# Patient Record
Sex: MALE | Race: WHITE | NOT HISPANIC OR LATINO | Employment: OTHER | URBAN - METROPOLITAN AREA
[De-identification: names, ages, dates, MRNs, and addresses within clinical notes are randomized per-mention and may not be internally consistent; named-entity substitution may affect disease eponyms.]

---

## 2020-03-03 ENCOUNTER — CLINICAL SUPPORT (OUTPATIENT)
Dept: FAMILY MEDICINE | Facility: CLINIC | Age: 70
End: 2020-03-03

## 2020-03-03 VITALS
HEIGHT: 72 IN | SYSTOLIC BLOOD PRESSURE: 152 MMHG | DIASTOLIC BLOOD PRESSURE: 78 MMHG | HEART RATE: 71 BPM | TEMPERATURE: 98 F | OXYGEN SATURATION: 98 % | BODY MASS INDEX: 37.65 KG/M2 | WEIGHT: 278 LBS | RESPIRATION RATE: 20 BRPM

## 2020-03-03 DIAGNOSIS — Z02.4 ENCOUNTER FOR DEPARTMENT OF TRANSPORTATION (DOT) EXAMINATION FOR DRIVING LICENSE RENEWAL: Primary | ICD-10-CM

## 2020-03-03 PROCEDURE — 99499 PHYSICAL - MODERATE COMPLEXITY: ICD-10-PCS | Mod: S$GLB,,, | Performed by: FAMILY MEDICINE

## 2020-03-03 PROCEDURE — 99499 UNLISTED E&M SERVICE: CPT | Mod: S$GLB,,, | Performed by: FAMILY MEDICINE

## 2020-03-03 RX ORDER — ENALAPRIL MALEATE 20 MG/1
20 TABLET ORAL DAILY
COMMUNITY
End: 2022-11-03

## 2020-03-03 RX ORDER — AMLODIPINE BESYLATE 10 MG/1
10 TABLET ORAL DAILY
COMMUNITY
End: 2022-11-03

## 2020-03-03 RX ORDER — METFORMIN HYDROCHLORIDE 500 MG/1
500 TABLET ORAL
COMMUNITY
End: 2023-06-14

## 2020-03-03 RX ORDER — METOPROLOL SUCCINATE 50 MG/1
50 TABLET, EXTENDED RELEASE ORAL DAILY
COMMUNITY

## 2020-03-03 RX ORDER — OMEPRAZOLE 40 MG/1
40 CAPSULE, DELAYED RELEASE ORAL DAILY
COMMUNITY

## 2020-03-03 RX ORDER — SIMVASTATIN 40 MG/1
40 TABLET, FILM COATED ORAL NIGHTLY
COMMUNITY

## 2020-03-03 RX ORDER — ASPIRIN 81 MG/1
81 TABLET ORAL DAILY
COMMUNITY
End: 2022-11-03

## 2020-03-03 RX ORDER — CLOPIDOGREL BISULFATE 75 MG/1
75 TABLET ORAL DAILY
COMMUNITY
End: 2022-11-03

## 2022-06-14 ENCOUNTER — CLINICAL SUPPORT (OUTPATIENT)
Dept: PULMONOLOGY | Facility: CLINIC | Age: 72
End: 2022-06-14
Payer: OTHER GOVERNMENT

## 2022-06-14 ENCOUNTER — OFFICE VISIT (OUTPATIENT)
Dept: PULMONOLOGY | Facility: CLINIC | Age: 72
End: 2022-06-14
Payer: OTHER GOVERNMENT

## 2022-06-14 VITALS
RESPIRATION RATE: 18 BRPM | SYSTOLIC BLOOD PRESSURE: 162 MMHG | WEIGHT: 286.63 LBS | DIASTOLIC BLOOD PRESSURE: 82 MMHG | HEIGHT: 72 IN | HEART RATE: 69 BPM | OXYGEN SATURATION: 97 % | BODY MASS INDEX: 38.82 KG/M2

## 2022-06-14 DIAGNOSIS — R94.2 DIFFUSION CAPACITY OF LUNG (DL), DECREASED: ICD-10-CM

## 2022-06-14 DIAGNOSIS — R91.1 LUNG NODULE: Primary | ICD-10-CM

## 2022-06-14 DIAGNOSIS — Z72.0 TOBACCO ABUSE: ICD-10-CM

## 2022-06-14 DIAGNOSIS — E66.9 OBESITY (BMI 35.0-39.9 WITHOUT COMORBIDITY): ICD-10-CM

## 2022-06-14 DIAGNOSIS — R91.1 LUNG NODULE: ICD-10-CM

## 2022-06-14 DIAGNOSIS — Z71.6 TOBACCO ABUSE COUNSELING: ICD-10-CM

## 2022-06-14 PROCEDURE — 94726 PULM FUNCT TST PLETHYSMOGRAP: ICD-10-PCS | Mod: S$GLB,,, | Performed by: INTERNAL MEDICINE

## 2022-06-14 PROCEDURE — 99204 PR OFFICE/OUTPT VISIT, NEW, LEVL IV, 45-59 MIN: ICD-10-PCS | Mod: 25,S$GLB,, | Performed by: INTERNAL MEDICINE

## 2022-06-14 PROCEDURE — 94726 PLETHYSMOGRAPHY LUNG VOLUMES: CPT | Mod: S$GLB,,, | Performed by: INTERNAL MEDICINE

## 2022-06-14 PROCEDURE — 99204 OFFICE O/P NEW MOD 45 MIN: CPT | Mod: 25,S$GLB,, | Performed by: INTERNAL MEDICINE

## 2022-06-14 PROCEDURE — 94060 EVALUATION OF WHEEZING: CPT | Mod: S$GLB,,, | Performed by: INTERNAL MEDICINE

## 2022-06-14 PROCEDURE — 94729 PR C02/MEMBANE DIFFUSE CAPACITY: ICD-10-PCS | Mod: S$GLB,,, | Performed by: INTERNAL MEDICINE

## 2022-06-14 PROCEDURE — 94060 PR EVAL OF BRONCHOSPASM: ICD-10-PCS | Mod: S$GLB,,, | Performed by: INTERNAL MEDICINE

## 2022-06-14 PROCEDURE — 94729 DIFFUSING CAPACITY: CPT | Mod: S$GLB,,, | Performed by: INTERNAL MEDICINE

## 2022-06-14 NOTE — PROGRESS NOTES
Subjective:    Patient Identification:   Patient ID: Marcia Gregory is a 71 y.o. male.    Ref[facility] Provider:   [facility]    Chief Complaint:   Right upper lung field FDG avid 12 mm pulmonary nodule in a smoker    History of Present Illness:    Marcia Gregory is a 71 y.o. male who presents  For the management of above-mentioned problem.    Patient had a CT scan of the chest in April which reportedly showed a right upper lung field nodule.  This was followed by a PET-CT scan ordered by his primary care provider in May of this year which showed 12.6 SUV avid right upper lung field 12 mm nodule suggestive of malignancy.  Patient has a prolonged history of tobacco use which is about 40 pack years and is also smoking at this point.  He denies any shortness of breath, cough, wheezing.  He is scheduled to go to [facility] to get a nasal mass removal which was thought not to be a malignancy but at high risk of malignancy in future.  Patient had seen nurse practitioner of Dr. Pham in the past and is currently on aspirin and Plavix for cardiac stents, last stent was put at least more than 10 years ago.  He has significant history of exposure to better chemicals, asbestos and foster gene gas.  He is currently working as a .  PFTs show a normal spirometry and lung volumes with a mildly reduced diffusion capacity which is 70% predicted.  He denies any loss of appetite, weight loss, hemoptysis, night sweats, fever or chills etc..    Review of Systems:  Review of Systems   Constitutional: Negative for fever, chills, weight loss, weight gain, activity change, appetite change, fatigue, night sweats and weakness.   HENT: Negative for nosebleeds, postnasal drip, rhinorrhea, sinus pressure, sore throat, trouble swallowing, voice change, congestion, ear pain and hearing loss.    Eyes: Negative for redness and itching.   Respiratory: Negative for cough, hemoptysis, sputum production, choking, chest tightness, shortness  of breath, wheezing, orthopnea, previous hospitialization due to pulmonary problems, asthma nighttime symptoms, pleurisy, dyspnea on extertion, use of rescue inhaler and Paroxysmal Nocturnal Dyspnea.    Cardiovascular: Negative for chest pain, palpitations and leg swelling.   Genitourinary: Negative for difficulty urinating and hematuria.   Endocrine: Negative for polydipsia, polyphagia, cold intolerance, heat intolerance and polyuria.    Musculoskeletal: Negative for arthralgias, back pain, gait problem, joint swelling and myalgias.   Skin: Negative for rash.   Gastrointestinal: Negative for nausea, vomiting, abdominal pain, abdominal distention and acid reflux.   Neurological: Negative for dizziness, syncope, weakness, light-headedness and headaches.   Hematological: Negative for adenopathy. Does not bruise/bleed easily and no excessive bruising.   Psychiatric/Behavioral: Negative for confusion and sleep disturbance. The patient is not nervous/anxious.          Allergies: Review of patient's allergies indicates:  No Known Allergies    Medications:      Past Medical History:      Past Medical History:   Diagnosis Date    Blood clot in vein     Diabetes mellitus, type 2     Hyperlipidemia     Hypertension     Myocardial infarction        Family History:      Family History   Problem Relation Age of Onset    Heart failure Father     Cancer Sister     Diabetes Brother         Social History:      Past Surgical History:   Procedure Laterality Date    cardiac stents         Physical Exam:  Vitals:    06/14/22 1102   BP: (!) 162/82   Pulse: 69   Resp: 18     Physical Exam   Constitutional: He is oriented to person, place, and time. He appears not cachectic. No distress. He is obese.   HENT:   Head: Normocephalic.   Right Ear: External ear normal.   Left Ear: External ear normal.   Nose: Nose normal. No mucosal edema. No polyps.   Mouth/Throat: Oropharynx is clear and moist. Normal dentition. No oropharyngeal  exudate. Mallampati Score: III.   Neck: No JVD present. No tracheal deviation present. No thyromegaly present.   Cardiovascular: Normal rate, regular rhythm, normal heart sounds and intact distal pulses. Exam reveals no gallop and no friction rub.   No murmur heard.  Pulmonary/Chest: Normal expansion, symmetric chest wall expansion, effort normal and breath sounds normal. No stridor. No respiratory distress. He has no decreased breath sounds. He has no wheezes. He has no rhonchi. He has no rales. Chest wall is not dull to percussion. He exhibits no tenderness. Negative for egophony. Negative for tactile fremitus.   Abdominal: Soft. Bowel sounds are normal. He exhibits no distension and no mass. There is no hepatosplenomegaly. There is no abdominal tenderness. There is no rebound and no guarding. No hernia.   Musculoskeletal:         General: No tenderness, deformity or edema. Normal range of motion.      Cervical back: Normal range of motion and neck supple.   Lymphadenopathy: No supraclavicular adenopathy is present.     He has no cervical adenopathy.     He has no axillary adenopathy.   Neurological: He is alert and oriented to person, place, and time. He has normal reflexes. He displays normal reflexes. No cranial nerve deficit. He exhibits normal muscle tone.   Skin: Skin is warm. No rash noted. No cyanosis or erythema. No pallor. Nails show no clubbing.   Psychiatric: He has a normal mood and affect. His behavior is normal. Judgment and thought content normal.            Accessory Clinical Data:      CT scan:   CT chest and PET-CT reports were reviewed from outside facility in findings dictated under HPI.    PFTs:   Mildly reduced at DLCO of 70% predicted likely due to active smoking, otherwise normal profile.    6MWT:  None available    TTE: none available    Lab data:    All radiographic imaging of the chest, PFT tracings/data, and 6MWT data have been independently reviewed and interpreted unless otherwise  specified.     Assessment and Plan:        Problem List Items Addressed This Visit    None     Visit Diagnoses     Lung nodule    -  Primary    Relevant Orders    CT Guided Needle Placement    Tobacco abuse        Relevant Orders    Ambulatory referral/consult to Smoking Cessation Program    Tobacco abuse counseling        Relevant Orders    Ambulatory referral/consult to Smoking Cessation Program    Diffusion capacity of lung (dl), decreased        Obesity (BMI 35.0-39.9 without comorbidity)             Orders Placed This Encounter   Procedures    CT Guided Needle Placement     Standing Status:   Future     Standing Expiration Date:   6/14/2023     Order Specific Question:   Reason for Exam:     Answer:   FDG avid RUL12 mm nodule in a 40+ pack year smoker     Order Specific Question:   May the Radiologist modify the order per protocol to meet the clinical needs of the patient?     Answer:   Yes    Ambulatory referral/consult to Smoking Cessation Program     Standing Status:   Future     Standing Expiration Date:   7/14/2023     Referral Priority:   Routine     Referral Type:   Consultation     Referral Reason:   Specialty Services Required     Requested Specialty:   CTTS     Number of Visits Requested:   1      Patient needs CT-guided biopsy as this nodule is highly suspicious for malignancy.    We will get Cardiology clearance to hold Plavix x 5 days for CT-guided right upper lung field nodule biopsy.  Patient tells me that this has been held several times for different surgeries without any problems.    Excellent PFTs if he needs resection.    PFTs do not meet criteria of chronic obstructive pulmonary disease by ats criteria.  Patient does not take any inhalers and is very active without any symptoms.    Tobacco cessation counseling was given to the patient and he will be referred to smoking cessation program as he was interested in quitting.    More than 50% of 45 min time was spent face-to-face on counseling,  in reviewing the imaging studies, reviewing notes from primary care provider, performing appropriate examination, counseling and educating the patient regarding the findings on CT/PET scan, ordering appropriate follow-up studies, documenting clinical information in the electronic medical record and care coordination.    Follow up for   After biopsy to discuss results.      Thank you very much for involving me in the care of this patient.  Please do not hesitate to reach me if you have any further questions or concerns.    This note is dictated on M*Modal word recognition program.  There are word recognition mistakes that are occasionally missed on review.

## 2022-07-18 LAB
ABS NRBC COUNT: 0 THOU/UL (ref 0–0.01)
ABSOLUTE BASOPHIL: 0.1 10*3/UL (ref 0–0.3)
ABSOLUTE EOSINOPHIL: 0.1 10*3/UL (ref 0–0.6)
ABSOLUTE IMMATURE GRAN: 0.03 THOU/UL (ref 0–0.03)
ABSOLUTE LYMPHOCYTE: 2.5 10*3/UL (ref 1.2–4)
ABSOLUTE MONOCYTE: 0.4 10*3/UL (ref 0.1–0.8)
APTT PPP: 35.6 SEC (ref 25.7–36.7)
BASOPHILS NFR BLD: 0.9 % (ref 0–3)
EOSINOPHIL NFR BLD: 1.7 % (ref 0–6)
ERYTHROCYTE [DISTWIDTH] IN BLOOD BY AUTOMATED COUNT: 13.7 % (ref 0–15.5)
HCT VFR BLD AUTO: 44.1 % (ref 42–52)
HGB BLD-MCNC: 14.5 G/DL (ref 14–18)
IMMATURE GRANULOCYTES: 0.4 % (ref 0–0.43)
IMMATURE PLATELET FRACTION: 2.2 % (ref 0–8.6)
INR PPP: 1 INR (ref 0.9–1.1)
LYMPHOCYTES NFR BLD: 31.1 % (ref 20–45)
MCH RBC QN AUTO: 27.1 PG (ref 27–32)
MCHC RBC AUTO-ENTMCNC: 32.9 % (ref 32–36)
MCV RBC AUTO: 82.3 FL (ref 80–97)
MONOCYTES NFR BLD: 5.1 % (ref 2–10)
NEUTROPHILS # BLD AUTO: 4.9 10*3/UL (ref 1.4–7)
NEUTROPHILS NFR BLD: 60.8 % (ref 50–80)
NUCLEATED RED BLOOD CELLS: 0 % (ref 0–0.2)
PLATELETS: 171 10*3/UL (ref 130–400)
PMV BLD AUTO: 9.6 FL (ref 9.2–12.2)
PROTHROMBIN TIME: 11.8 SEC (ref 10.2–12.9)
RBC # BLD AUTO: 5.36 10*6/UL (ref 4.7–6.1)
WBC # BLD: 8 10*3/UL (ref 4.5–10)

## 2022-07-20 LAB — SPECIMEN TO PATHOLOGY: NORMAL

## 2022-07-25 ENCOUNTER — OFFICE VISIT (OUTPATIENT)
Dept: HEMATOLOGY/ONCOLOGY | Facility: CLINIC | Age: 72
End: 2022-07-25
Payer: OTHER GOVERNMENT

## 2022-07-25 VITALS
SYSTOLIC BLOOD PRESSURE: 157 MMHG | RESPIRATION RATE: 18 BRPM | HEART RATE: 67 BPM | WEIGHT: 279 LBS | DIASTOLIC BLOOD PRESSURE: 80 MMHG | BODY MASS INDEX: 37.79 KG/M2 | OXYGEN SATURATION: 97 % | HEIGHT: 72 IN

## 2022-07-25 DIAGNOSIS — C34.91 NSCLC OF RIGHT LUNG: ICD-10-CM

## 2022-07-25 DIAGNOSIS — C34.11 MALIGNANT NEOPLASM OF UPPER LOBE OF RIGHT LUNG: ICD-10-CM

## 2022-07-25 PROCEDURE — 99205 PR OFFICE/OUTPT VISIT, NEW, LEVL V, 60-74 MIN: ICD-10-PCS | Mod: S$GLB,,, | Performed by: INTERNAL MEDICINE

## 2022-07-25 PROCEDURE — 99205 OFFICE O/P NEW HI 60 MIN: CPT | Mod: S$GLB,,, | Performed by: INTERNAL MEDICINE

## 2022-07-25 NOTE — PROGRESS NOTES
MEDICAL ONCOLOGY INITIAL CONSULTATION NOTE    Patient ID: Marcia Gregory is a 71 y.o. male.    Chief Complaint: Lung Cancer    HPI:  Patient is a 71-year-old male active current smoker with past medical history of DVT, diabetes mellitus, hyperlipidemia, hypertension, CAD status post myocardial infarction with past surgical history of knee replacement and tonsillectomy was referred by Pulmonary after recent right upper lobe lung nodule core biopsy was consistent with moderately differentiated squamous cell carcinoma please see detailed pathology report below.  Patient reports having a PET scan done on 2022 which showed findings as below.  Patient presents to our clinic today 22 for further evaluation.      Imagin22    Chest:  There is a 12 mm nodule in the right upper lung field showing an increased radiotracer uptake with an SUV of 12.6    Abdomen:  Normal  Pelvis:  Normal  Osseous structures:  Normal    Impression:  Hypermetabolic nodule in the right upper lung field consistent with malignancy    Pathology: 22      RIGHT UPPER LOBE OF LUNG, NODULE, CORE BIOPSY:      - MODERATELY DIFFERENTIATED SQUAMOUS CELL CARCINOMA (NON-SMALL   CARCINOMA).   Comment:  Immunohistochemical staining is performed upon sections from block   1A.  The tumor cells demonstrate positivity for CK5/6, p40, and p63.   Staining is negative for TTF-1 and CK7.  All associated controls stain as   expected.         Past Medical History:   Diagnosis Date    Blood clot in vein     Diabetes mellitus, type 2     Hyperlipidemia     Hypertension     Myocardial infarction     Skin cancer      Family History   Problem Relation Age of Onset    Heart failure Father     Cancer Sister     Breast cancer Sister     Diabetes Brother      Social History     Socioeconomic History    Marital status:    Tobacco Use    Smoking status: Current Every Day Smoker     Packs/day: 1.00     Years: 40.00     Pack years: 40.00      Types: Cigarettes    Smokeless tobacco: Never Used   Substance and Sexual Activity    Alcohol use: Not Currently     Comment: maybe once a year    Drug use: Never     Past Surgical History:   Procedure Laterality Date    cardiac stents      KNEE SURGERY      TONSILLECTOMY           Review of systems:  Review of Systems   Constitutional: Negative for activity change, appetite change, chills, diaphoresis, fatigue and unexpected weight change.   HENT: Negative for congestion, facial swelling, hearing loss, mouth sores, trouble swallowing and voice change.    Eyes: Negative for photophobia, pain, discharge and itching.   Respiratory: Negative for apnea, cough, choking, chest tightness and shortness of breath.    Cardiovascular: Negative for chest pain, palpitations and leg swelling.   Gastrointestinal: Negative for abdominal distention, abdominal pain, anal bleeding and blood in stool.   Endocrine: Negative for cold intolerance, heat intolerance, polydipsia and polyphagia.   Genitourinary: Negative for difficulty urinating, dysuria, flank pain and hematuria.   Musculoskeletal: Negative for arthralgias, back pain, joint swelling, myalgias, neck pain and neck stiffness.   Skin: Negative for color change, pallor and wound.   Allergic/Immunologic: Negative for environmental allergies, food allergies and immunocompromised state.   Neurological: Negative for dizziness, seizures, facial asymmetry, speech difficulty, light-headedness, numbness and headaches.   Hematological: Negative for adenopathy. Does not bruise/bleed easily.   Psychiatric/Behavioral: Negative for agitation, behavioral problems, confusion, decreased concentration and sleep disturbance.            Physical Exam  Vitals and nursing note reviewed.   Constitutional:       General: He is not in acute distress.     Appearance: Normal appearance. He is not ill-appearing.   HENT:      Head: Normocephalic and atraumatic.      Nose: No congestion or  rhinorrhea.   Eyes:      General: No scleral icterus.     Extraocular Movements: Extraocular movements intact.      Pupils: Pupils are equal, round, and reactive to light.   Cardiovascular:      Rate and Rhythm: Normal rate and regular rhythm.      Pulses: Normal pulses.      Heart sounds: Normal heart sounds. No murmur heard.    No gallop.   Pulmonary:      Effort: Pulmonary effort is normal. No respiratory distress.      Breath sounds: Normal breath sounds. No stridor. No wheezing or rhonchi.   Abdominal:      General: Bowel sounds are normal. There is no distension.      Palpations: There is no mass.      Tenderness: There is no abdominal tenderness. There is no guarding.   Musculoskeletal:         General: No swelling, tenderness, deformity or signs of injury. Normal range of motion.      Cervical back: Normal range of motion and neck supple. No rigidity. No muscular tenderness.      Right lower leg: No edema.      Left lower leg: No edema.   Skin:     General: Skin is warm.      Coloration: Skin is not jaundiced or pale.      Findings: No bruising or lesion.   Neurological:      General: No focal deficit present.      Mental Status: He is alert and oriented to person, place, and time.      Cranial Nerves: No cranial nerve deficit.      Sensory: No sensory deficit.      Motor: No weakness.      Gait: Gait normal.   Psychiatric:         Mood and Affect: Mood normal.         Behavior: Behavior normal.         Thought Content: Thought content normal.              Vitals:    07/25/22 0828   BP: (!) 157/80   Pulse: 67   Resp: 18   Body surface area is 2.54 meters squared.    Assessment/Plan:      ECOG 1    1. 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  == Core biopsy done 07/20/2022 completed few days back showed findings as above.  I discussed with  him further management options for early stage lung cancer.  PFTs showed mildly reduced at DLCO of 70% predicted likely due to active smoking otherwise- normal profile.  I will make referral to cardiothoracic surgery for further evaluation.  Ideally I would like a PET scan to be repeated since it has been more than 2 months since his prior PET scan, but even if this is not done I would like this patient to be evaluated by Cardiothoracic surgery for consideration of pathologic mediastinal lymph node evaluation and if an operable candidate to undergo surgical exploration and resection.  Based on surgical resection we will determine if there is a need for any adjuvant treatment.  For clinical stage I A 2 with negative margins only observation is indicated.       Plan:    Referral to CT surgery- [facility]  PET scan for restaging      RTC in 3 weeks for MD visit with labs: CBC, CMP prior      Total time spent in counseling and discussion about further management options including relevant lab work, treatment,  prognosis, medications and intended side effects was more than 60 minutes. More than 50% of the time was spent on counseling and coordination of care.  I spent a total of 60 minutes on the day of the visit.This includes face to face time and non-face to face time preparing to see the patient (eg, review of tests), Obtaining and/or reviewing separately obtained history, Documenting clinical information in the electronic or other health record, Independently interpreting resultsand communicating results to the patient/family/caregiver, or Care coordination.

## 2022-08-01 ENCOUNTER — TELEPHONE (OUTPATIENT)
Dept: CARDIOTHORACIC SURGERY | Facility: CLINIC | Age: 72
End: 2022-08-01
Payer: MEDICARE

## 2022-08-01 NOTE — TELEPHONE ENCOUNTER
Received referral , called patient to schedule an appt. [facility] 11:10 am. 8/1/22.

## 2022-08-02 ENCOUNTER — CLINICAL SUPPORT (OUTPATIENT)
Dept: SMOKING CESSATION | Facility: CLINIC | Age: 72
End: 2022-08-02
Payer: COMMERCIAL

## 2022-08-02 DIAGNOSIS — F17.200 NICOTINE DEPENDENCE: Primary | ICD-10-CM

## 2022-08-02 PROCEDURE — 99404 PREV MED CNSL INDIV APPRX 60: CPT | Mod: S$GLB,,, | Performed by: GENERAL PRACTICE

## 2022-08-02 PROCEDURE — 99404 PR PREVENT COUNSEL,INDIV,60 MIN: ICD-10-PCS | Mod: S$GLB,,, | Performed by: GENERAL PRACTICE

## 2022-08-02 RX ORDER — IBUPROFEN 200 MG
1 TABLET ORAL DAILY
Qty: 28 PATCH | Refills: 0 | Status: SHIPPED | OUTPATIENT
Start: 2022-08-02 | End: 2023-06-14

## 2022-08-02 RX ORDER — DIPHENHYDRAMINE HCL 25 MG
4 CAPSULE ORAL
Qty: 200 EACH | Refills: 0 | Status: SHIPPED | OUTPATIENT
Start: 2022-08-02 | End: 2023-06-14

## 2022-08-17 ENCOUNTER — OFFICE VISIT (OUTPATIENT)
Dept: HEMATOLOGY/ONCOLOGY | Facility: CLINIC | Age: 72
End: 2022-08-17
Payer: OTHER GOVERNMENT

## 2022-08-17 VITALS
HEART RATE: 77 BPM | SYSTOLIC BLOOD PRESSURE: 146 MMHG | HEIGHT: 72 IN | OXYGEN SATURATION: 97 % | WEIGHT: 278 LBS | DIASTOLIC BLOOD PRESSURE: 82 MMHG | BODY MASS INDEX: 37.65 KG/M2 | RESPIRATION RATE: 17 BRPM

## 2022-08-17 DIAGNOSIS — C34.91 NSCLC OF RIGHT LUNG: Primary | ICD-10-CM

## 2022-08-17 DIAGNOSIS — C80.1 VERRUCOUS CARCINOMA: ICD-10-CM

## 2022-08-17 PROCEDURE — 99215 PR OFFICE/OUTPT VISIT, EST, LEVL V, 40-54 MIN: ICD-10-PCS | Mod: S$GLB,,, | Performed by: NURSE PRACTITIONER

## 2022-08-17 PROCEDURE — 99215 OFFICE O/P EST HI 40 MIN: CPT | Mod: S$GLB,,, | Performed by: NURSE PRACTITIONER

## 2022-08-17 NOTE — PROGRESS NOTES
MEDICAL ONCOLOGY INITIAL CONSULTATION NOTE    Patient ID: Marcia Gregory is a 71 y.o. male.    Chief Complaint: Lung Cancer    HPI:  Patient is a 71-year-old male active current smoker with past medical history of DVT, diabetes mellitus, hyperlipidemia, hypertension, CAD status post myocardial infarction with past surgical history of knee replacement and tonsillectomy was referred by Pulmonary after recent right upper lobe lung nodule core biopsy was consistent with moderately differentiated squamous cell carcinoma please see detailed pathology report below.  Patient reports having a PET scan done on 2022 which showed findings as below.  Patient presents to our clinic today 22 for further evaluation.      Imagin22    Chest:  There is a 12 mm nodule in the right upper lung field showing an increased radiotracer uptake with an SUV of 12.6    Abdomen:  Normal  Pelvis:  Normal  Osseous structures:  Normal    Impression:  Hypermetabolic nodule in the right upper lung field consistent with malignancy    Pathology: 22      RIGHT UPPER LOBE OF LUNG, NODULE, CORE BIOPSY:      - MODERATELY DIFFERENTIATED SQUAMOUS CELL CARCINOMA (NON-SMALL   CARCINOMA).   Comment:  Immunohistochemical staining is performed upon sections from block   1A.  The tumor cells demonstrate positivity for CK5/6, p40, and p63.   Staining is negative for TTF-1 and CK7.  All associated controls stain as   expected.         Past Medical History:   Diagnosis Date    Blood clot in vein     Diabetes mellitus, type 2     Hyperlipidemia     Hypertension     Myocardial infarction     NSCLC of right lung 2022    Skin cancer      Family History   Problem Relation Age of Onset    Heart failure Father     Cancer Sister     Breast cancer Sister     Diabetes Brother      Social History     Socioeconomic History    Marital status:    Tobacco Use    Smoking status: Current Every Day Smoker      Packs/day: 1.00     Years: 40.00     Pack years: 40.00     Types: Cigarettes    Smokeless tobacco: Never Used   Substance and Sexual Activity    Alcohol use: Not Currently     Comment: maybe once a year    Drug use: Never     Past Surgical History:   Procedure Laterality Date    cardiac stents      KNEE SURGERY      TONSILLECTOMY           Review of systems:  Review of Systems   Constitutional: Negative for activity change, appetite change, chills, diaphoresis, fatigue and unexpected weight change.   HENT: Negative for congestion, facial swelling, hearing loss, mouth sores, trouble swallowing and voice change.    Eyes: Negative for photophobia, pain, discharge and itching.   Respiratory: Negative for apnea, cough, choking, chest tightness and shortness of breath.    Cardiovascular: Negative for chest pain, palpitations and leg swelling.   Gastrointestinal: Negative for abdominal distention, abdominal pain, anal bleeding and blood in stool.   Endocrine: Negative for cold intolerance, heat intolerance, polydipsia and polyphagia.   Genitourinary: Negative for difficulty urinating, dysuria, flank pain and hematuria.   Musculoskeletal: Negative for arthralgias, back pain, joint swelling, myalgias, neck pain and neck stiffness.   Skin: Negative for color change, pallor and wound.   Allergic/Immunologic: Negative for environmental allergies, food allergies and immunocompromised state.   Neurological: Negative for dizziness, seizures, facial asymmetry, speech difficulty, light-headedness, numbness and headaches.   Hematological: Negative for adenopathy. Does not bruise/bleed easily.   Psychiatric/Behavioral: Negative for agitation, behavioral problems, confusion, decreased concentration and sleep disturbance.            Physical Exam  Vitals and nursing note reviewed.   Constitutional:       General: He is not in acute distress.     Appearance: Normal appearance. He is not ill-appearing.   HENT:      Head:  Normocephalic and atraumatic.      Nose: No congestion or rhinorrhea.   Eyes:      General: No scleral icterus.     Extraocular Movements: Extraocular movements intact.      Pupils: Pupils are equal, round, and reactive to light.   Cardiovascular:      Rate and Rhythm: Normal rate and regular rhythm.      Pulses: Normal pulses.      Heart sounds: Normal heart sounds. No murmur heard.    No gallop.   Pulmonary:      Effort: Pulmonary effort is normal. No respiratory distress.      Breath sounds: Normal breath sounds. No stridor. No wheezing or rhonchi.   Abdominal:      General: Bowel sounds are normal. There is no distension.      Palpations: There is no mass.      Tenderness: There is no abdominal tenderness. There is no guarding.   Musculoskeletal:         General: No swelling, tenderness, deformity or signs of injury. Normal range of motion.      Cervical back: Normal range of motion and neck supple. No rigidity. No muscular tenderness.      Right lower leg: No edema.      Left lower leg: No edema.   Skin:     General: Skin is warm.      Coloration: Skin is not jaundiced or pale.      Findings: No bruising or lesion.   Neurological:      General: No focal deficit present.      Mental Status: He is alert and oriented to person, place, and time.      Cranial Nerves: No cranial nerve deficit.      Sensory: No sensory deficit.      Motor: No weakness.      Gait: Gait normal.   Psychiatric:         Mood and Affect: Mood normal.         Behavior: Behavior normal.         Thought Content: Thought content normal.              Vitals:    08/17/22 0925   BP: (!) 146/82   Pulse: 77   Resp: 17   Body surface area is 2.53 meters squared.    Assessment/Plan:      ECOG 1    1. 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  == Core biopsy done 07/20/2022 completed  few days back showed findings as above.  I discussed with him further management options for early stage lung cancer.  PFTs showed mildly reduced at DLCO of 70% predicted likely due to active smoking otherwise- normal profile.  I will make referral to cardiothoracic surgery for further evaluation.  Ideally I would like a PET scan to be repeated since it has been more than 2 months since his prior PET scan, but even if this is not done I would like this patient to be evaluated by Cardiothoracic surgery for consideration of pathologic mediastinal lymph node evaluation and if an operable candidate to undergo surgical exploration and resection.  Based on surgical resection we will determine if there is a need for any adjuvant treatment.  For clinical stage I A 2 with negative margins only observation is indicated.   8/19/22: review of PET/CT shows very minimal disease growth. Lung lesion now measuring 14 mm (12 mm prior). He is to see Dr Isaac for surgical evaluation next week.     2. Right intranasal lesion: WELL DIFFERENTIATED VERRUCOID SCC CAN'T CONFIRM INVASION  - evaluated at [facility] for nasal polyp at the end of July, s/p recision on 8/2/22.  - 3 month f/u at [facility] planned      RTC in 2 weeks for MD visit to review POC after surgical evaluation       Total time spent in counseling and discussion about further management options including relevant lab work, treatment,  prognosis, medications and intended side effects was more than 60 minutes. More than 50% of the time was spent on counseling and coordination of care.  I spent a total of 60 minutes on the day of the visit.This includes face to face time and non-face to face time preparing to see the patient (eg, review of tests), Obtaining and/or reviewing separately obtained history, Documenting clinical information in the electronic or other health record, Independently interpreting resultsand communicating results to the patient/family/caregiver, or Care coordination.

## 2022-08-19 PROBLEM — C80.1 VERRUCOUS CARCINOMA: Status: ACTIVE | Noted: 2022-08-19

## 2022-08-23 ENCOUNTER — OFFICE VISIT (OUTPATIENT)
Dept: CARDIOTHORACIC SURGERY | Facility: CLINIC | Age: 72
End: 2022-08-23
Payer: OTHER GOVERNMENT

## 2022-08-23 VITALS
SYSTOLIC BLOOD PRESSURE: 128 MMHG | OXYGEN SATURATION: 97 % | BODY MASS INDEX: 37.43 KG/M2 | HEART RATE: 78 BPM | DIASTOLIC BLOOD PRESSURE: 82 MMHG | WEIGHT: 276 LBS

## 2022-08-23 DIAGNOSIS — C34.11 MALIGNANT NEOPLASM OF UPPER LOBE OF RIGHT LUNG: Primary | ICD-10-CM

## 2022-08-23 DIAGNOSIS — I25.10 CORONARY ARTERY DISEASE, UNSPECIFIED VESSEL OR LESION TYPE, UNSPECIFIED WHETHER ANGINA PRESENT, UNSPECIFIED WHETHER NATIVE OR TRANSPLANTED HEART: Primary | ICD-10-CM

## 2022-08-23 PROCEDURE — 99204 OFFICE O/P NEW MOD 45 MIN: CPT | Mod: S$GLB,,, | Performed by: THORACIC SURGERY (CARDIOTHORACIC VASCULAR SURGERY)

## 2022-08-23 PROCEDURE — 99204 PR OFFICE/OUTPT VISIT, NEW, LEVL IV, 45-59 MIN: ICD-10-PCS | Mod: S$GLB,,, | Performed by: THORACIC SURGERY (CARDIOTHORACIC VASCULAR SURGERY)

## 2022-08-24 NOTE — PROGRESS NOTES
..  Subjective:      Patient ID: Marcia Gregory is a 71 y.o. male who presents for evaluation of Right upper lobe pulmonary nodule.   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 Denies SOB, dyspnea, cough, weight loss , appetite changes,  chills, fever, or fatigue.  Current every day smoker, 1 PPD for about 40 years, does not chew or dip . States he is a  and has been exposed to multiple chemical agents in the past .  Reports sinus surgery 3 weeks ago to remove a nasal mass at [facility], he is not scheduled for chemotherapy or radiation, he has a follow up scheduled with them next week.  History of cigarette smoker > 30 years, DVT, CAD s/p  MI about 10 years ago ( stent placed ), HTN, HL, DM, knee replacement and tonsillectomy.  Family history of heart failure, cancer, DM. Risk factors include: 71 year old male,  current every day smoker, DM, CAD, HTN, HL, DM and family history of cancer.      Review of Systems   Constitutional: Negative. Negative for chills, decreased appetite, diaphoresis, fever, malaise/fatigue, night sweats, weight gain and weight loss.   HENT:  Negative for congestion, ear discharge, ear pain, hearing loss, hoarse voice, nosebleeds, odynophagia, sore throat and tinnitus.    Eyes:  Negative for blurred vision, discharge, double vision, pain, photophobia, redness, vision loss in left eye, vision loss in right eye, visual disturbance and visual halos.   Cardiovascular:  Negative for chest pain, claudication, cyanosis, dyspnea on exertion, irregular heartbeat, leg swelling, near-syncope, orthopnea, palpitations, paroxysmal nocturnal dyspnea and syncope.   Respiratory:  Negative for cough, hemoptysis, shortness of breath,  sleep disturbances due to breathing, snoring and sputum production.    Endocrine: Negative for cold intolerance, heat intolerance, polydipsia, polyphagia and polyuria.   Hematologic/Lymphatic: Negative for adenopathy and bleeding problem. Does not bruise/bleed easily.   Skin:  Negative for color change, dry skin, flushing, itching, nail changes, poor wound healing, rash, skin cancer, suspicious lesions and unusual hair distribution.   Musculoskeletal:  Negative for arthritis, back pain, falls, gout, joint pain, joint swelling, muscle cramps, muscle weakness, myalgias, neck pain and stiffness.   Gastrointestinal:  Negative for bloating, abdominal pain, anorexia, change in bowel habit, bowel incontinence, constipation, diarrhea, dysphagia, excessive appetite, flatus, heartburn, hematemesis, hematochezia, hemorrhoids, jaundice, melena, nausea and vomiting.   Genitourinary:  Negative for bladder incontinence, decreased libido, dysuria, flank pain, frequency, genital sores, hematuria, hesitancy, incomplete emptying, menorrhagia, missed menses, nocturia, non-menstrual bleeding, pelvic pain and urgency.   Neurological:  Negative for aphonia, brief paralysis, difficulty with concentration, disturbances in coordination, excessive daytime sleepiness, dizziness, focal weakness, headaches, light-headedness, loss of balance, numbness, paresthesias, seizures, sensory change, tremors, vertigo and weakness.   Psychiatric/Behavioral:  Negative for altered mental status, depression, hallucinations, hypervigilance, memory loss, substance abuse, suicidal ideas and thoughts of violence. The patient does not have insomnia and is not nervous/anxious.    Allergic/Immunologic: Negative for environmental allergies, HIV exposure, hives and persistent infections.    Past Medical History:   Diagnosis Date    Blood clot in vein     Diabetes mellitus, type 2     Hyperlipidemia     Hypertension     Myocardial infarction     NSCLC of right lung  7/25/2022    Skin cancer       Past Surgical History:   Procedure Laterality Date    cardiac stents      KNEE SURGERY      TONSILLECTOMY        Family History   Problem Relation Age of Onset    Heart failure Father     Cancer Sister     Breast cancer Sister     Diabetes Brother       Social History     Socioeconomic History    Marital status:    Tobacco Use    Smoking status: Current Every Day Smoker     Packs/day: 1.00     Years: 40.00     Pack years: 40.00     Types: Cigarettes    Smokeless tobacco: Never Used   Substance and Sexual Activity    Alcohol use: Not Currently     Comment: maybe once a year    Drug use: Never        Medication List with Changes/Refills   Current Medications    AMLODIPINE (NORVASC) 10 MG TABLET    Take 10 mg by mouth once daily.    ASPIRIN (ECOTRIN) 81 MG EC TABLET    Take 81 mg by mouth once daily.    CLOPIDOGREL (PLAVIX) 75 MG TABLET    Take 75 mg by mouth once daily.    ENALAPRIL (VASOTEC) 20 MG TABLET    Take 20 mg by mouth once daily.    METFORMIN (GLUCOPHAGE) 500 MG TABLET    Take 500 mg by mouth daily with breakfast.    METOPROLOL SUCCINATE (TOPROL-XL) 50 MG 24 HR TABLET    Take 50 mg by mouth once daily.    NICOTINE (NICODERM CQ) 21 MG/24 HR    Place 1 patch onto the skin once daily.    NICOTINE POLACRILEX (NICORETTE) 4 MG GUM    Take 1 each (4 mg total) by mouth as needed (Do not use more than 10 pieces in 24 hours).    OMEPRAZOLE (PRILOSEC) 40 MG CAPSULE    Take 40 mg by mouth once daily.    SIMVASTATIN (ZOCOR) 40 MG TABLET    Take 40 mg by mouth every evening.        Objective:     /82 (BP Location: Left arm)   Pulse 78   Wt 125.2 kg (276 lb)   SpO2 97%   BMI 37.43 kg/m²     Physical Exam  Constitutional:       General: He is not in acute distress.     Appearance: Normal appearance. He is obese. He is not ill-appearing, toxic-appearing or diaphoretic.   HENT:      Head: Normocephalic and atraumatic.      Nose: Nose normal.      Mouth/Throat:      Mouth: Mucous  membranes are dry.   Eyes:      Extraocular Movements: Extraocular movements intact.      Conjunctiva/sclera: Conjunctivae normal.      Pupils: Pupils are equal, round, and reactive to light.   Cardiovascular:      Rate and Rhythm: Tachycardia present. Rhythm irregular.      Pulses: Normal pulses.      Heart sounds: Normal heart sounds. No murmur heard.    No gallop.   Pulmonary:      Effort: Pulmonary effort is normal. No respiratory distress.      Breath sounds: Normal breath sounds. No wheezing, rhonchi or rales.   Chest:      Chest wall: No tenderness.   Abdominal:      General: Abdomen is flat. Bowel sounds are normal. There is no distension.      Palpations: Abdomen is soft.      Tenderness: There is no abdominal tenderness. There is no right CVA tenderness or guarding.   Musculoskeletal:         General: No swelling, tenderness, deformity or signs of injury. Normal range of motion.      Cervical back: Normal range of motion.      Right lower leg: No edema.      Left lower leg: No edema.   Skin:     General: Skin is warm and dry.      Capillary Refill: Capillary refill takes more than 3 seconds.      Findings: No bruising or erythema.   Neurological:      General: No focal deficit present.      Mental Status: He is alert. Mental status is at baseline. He is disoriented.      Sensory: No sensory deficit.      Motor: No weakness.      Coordination: Coordination normal.      Gait: Gait normal.   Psychiatric:         Mood and Affect: Mood normal.        Labs:    Lab Results   Component Value Date    WBC 8.0 07/18/2022    HGB 14.5 07/18/2022    HCT 44.1 07/18/2022    MCV 82.3 07/18/2022    LABPLAT 171 07/18/2022   CXR 7/18/22: ( post biopsy) No evidence of pneumothorax, no infiltrates or consolidation, no effusion. Cardiomediastinal sillhouette is within  Normal limits for size. Calcified plaque in the aortic arch. Bones and soft tissues are unremarkable.    Path report ( Right upper lobe lung nodule) 7/20/22 :  Core biopsy- moderately differentiated squamous cell carcinoma ( non-small carcinoma)    PET SCAN: 8/16/22: chest : hypermetabolic lesion in the upper lone on the right. Lesion appers to have some cavitation with a maxium diameter of approximately 14 mm versus 12 mm on previous examination . The solid componesnt measures about 8 mm today. SUV in lianne area is 10.2 versus 12.6 on previous exam.     PFT 6/14/22: Normal spirometry and lung volumes, mildly reduced DLCO.       Assessment & Plan:   71-year-old gentleman right upper lobe nodule had biopsy-proven squamous cell carcinoma.  Metastatic workup is negative.  Pulmonary function tests are reasonable.  He will be able to tolerate pulmonary resection.  He had not  had cardiac workup I recommended. Stress Echocardiogram.  If the Cardiac workup is negative I recommend a right upper lobectomy and mediastinal lymph node dissection.  In a 60 minute appointment today 15 minutes were  spent in taking history and exami ining the patient the remaining time was spent in explaining the indications for surgery, procedure complications and the risks involved and counseled the patient .  I also ordered stress echocardiogram and will have follow-up. If the echocardiogram negative, we will schedule him for surgery

## 2022-08-25 ENCOUNTER — TELEPHONE (OUTPATIENT)
Dept: CARDIOTHORACIC SURGERY | Facility: CLINIC | Age: 72
End: 2022-08-25
Payer: MEDICARE

## 2022-08-31 ENCOUNTER — OFFICE VISIT (OUTPATIENT)
Dept: HEMATOLOGY/ONCOLOGY | Facility: CLINIC | Age: 72
End: 2022-08-31
Payer: OTHER GOVERNMENT

## 2022-08-31 VITALS
RESPIRATION RATE: 18 BRPM | OXYGEN SATURATION: 95 % | SYSTOLIC BLOOD PRESSURE: 144 MMHG | BODY MASS INDEX: 37.38 KG/M2 | DIASTOLIC BLOOD PRESSURE: 91 MMHG | HEART RATE: 69 BPM | HEIGHT: 72 IN | WEIGHT: 276 LBS

## 2022-08-31 DIAGNOSIS — C34.91 NSCLC OF RIGHT LUNG: Primary | ICD-10-CM

## 2022-08-31 PROCEDURE — 99215 OFFICE O/P EST HI 40 MIN: CPT | Mod: S$GLB,,, | Performed by: INTERNAL MEDICINE

## 2022-08-31 PROCEDURE — 99215 PR OFFICE/OUTPT VISIT, EST, LEVL V, 40-54 MIN: ICD-10-PCS | Mod: S$GLB,,, | Performed by: INTERNAL MEDICINE

## 2022-08-31 NOTE — PROGRESS NOTES
MEDICAL ONCOLOGY FOLLOW UP CONSULTATION NOTE    Patient ID: Marcia Gregory is a 71 y.o. male.    Chief Complaint: Lung Cancer    HPI:  Patient is a 71-year-old male active current smoker with past medical history of DVT, diabetes mellitus, hyperlipidemia, hypertension, CAD status post myocardial infarction with past surgical history of knee replacement and tonsillectomy was referred by Pulmonary after recent right upper lobe lung nodule core biopsy was consistent with moderately differentiated squamous cell carcinoma please see detailed pathology report below.  Patient reports having a PET scan done on 2022 which showed findings as below.  Patient presents to our clinic today 22 for further evaluation.      Imagin22    Chest:  There is a 12 mm nodule in the right upper lung field showing an increased radiotracer uptake with an SUV of 12.6    Abdomen:  Normal  Pelvis:  Normal  Osseous structures:  Normal    Impression:  Hypermetabolic nodule in the right upper lung field consistent with malignancy    Pathology: 22      RIGHT UPPER LOBE OF LUNG, NODULE, CORE BIOPSY:      - MODERATELY DIFFERENTIATED SQUAMOUS CELL CARCINOMA (NON-SMALL   CARCINOMA).   Comment:  Immunohistochemical staining is performed upon sections from block   1A.  The tumor cells demonstrate positivity for CK5/6, p40, and p63.   Staining is negative for TTF-1 and CK7.  All associated controls stain as   expected.         Past Medical History:   Diagnosis Date    Blood clot in vein     Diabetes mellitus, type 2     Hyperlipidemia     Hypertension     Myocardial infarction     NSCLC of right lung 2022    Skin cancer      Family History   Problem Relation Age of Onset    Heart failure Father     Cancer Sister     Breast cancer Sister     Diabetes Brother      Social History     Socioeconomic History    Marital status:    Tobacco Use    Smoking status: Every Day     Packs/day: 1.00     Years:  40.00     Pack years: 40.00     Types: Cigarettes    Smokeless tobacco: Never    Tobacco comments:     Patient states he is cutting down 6-7 cigarettes a day   Substance and Sexual Activity    Alcohol use: Not Currently     Comment: maybe once a year    Drug use: Never     Past Surgical History:   Procedure Laterality Date    cardiac stents      KNEE SURGERY      TONSILLECTOMY           Review of systems:  Review of Systems   Constitutional:  Negative for activity change, appetite change, chills, diaphoresis, fatigue and unexpected weight change.   HENT:  Negative for congestion, facial swelling, hearing loss, mouth sores, trouble swallowing and voice change.    Eyes:  Negative for photophobia, pain, discharge and itching.   Respiratory:  Negative for apnea, cough, choking, chest tightness and shortness of breath.    Cardiovascular:  Negative for chest pain, palpitations and leg swelling.   Gastrointestinal:  Negative for abdominal distention, abdominal pain, anal bleeding and blood in stool.   Endocrine: Negative for cold intolerance, heat intolerance, polydipsia and polyphagia.   Genitourinary:  Negative for difficulty urinating, dysuria, flank pain and hematuria.   Musculoskeletal:  Negative for arthralgias, back pain, joint swelling, myalgias, neck pain and neck stiffness.   Skin:  Negative for color change, pallor and wound.   Allergic/Immunologic: Negative for environmental allergies, food allergies and immunocompromised state.   Neurological:  Negative for dizziness, seizures, facial asymmetry, speech difficulty, light-headedness, numbness and headaches.   Hematological:  Negative for adenopathy. Does not bruise/bleed easily.   Psychiatric/Behavioral:  Negative for agitation, behavioral problems, confusion, decreased concentration and sleep disturbance.           Physical Exam  Vitals and nursing note reviewed.   Constitutional:       General: He is not in acute distress.     Appearance: Normal appearance. He  is not ill-appearing.   HENT:      Head: Normocephalic and atraumatic.      Nose: No congestion or rhinorrhea.   Eyes:      General: No scleral icterus.     Extraocular Movements: Extraocular movements intact.      Pupils: Pupils are equal, round, and reactive to light.   Cardiovascular:      Rate and Rhythm: Normal rate and regular rhythm.      Pulses: Normal pulses.      Heart sounds: Normal heart sounds. No murmur heard.    No gallop.   Pulmonary:      Effort: Pulmonary effort is normal. No respiratory distress.      Breath sounds: Normal breath sounds. No stridor. No wheezing or rhonchi.   Abdominal:      General: Bowel sounds are normal. There is no distension.      Palpations: There is no mass.      Tenderness: There is no abdominal tenderness. There is no guarding.   Musculoskeletal:         General: No swelling, tenderness, deformity or signs of injury. Normal range of motion.      Cervical back: Normal range of motion and neck supple. No rigidity. No muscular tenderness.      Right lower leg: No edema.      Left lower leg: No edema.   Skin:     General: Skin is warm.      Coloration: Skin is not jaundiced or pale.      Findings: No bruising or lesion.   Neurological:      General: No focal deficit present.      Mental Status: He is alert and oriented to person, place, and time.      Cranial Nerves: No cranial nerve deficit.      Sensory: No sensory deficit.      Motor: No weakness.      Gait: Gait normal.   Psychiatric:         Mood and Affect: Mood normal.         Behavior: Behavior normal.         Thought Content: Thought content normal.            Vitals:    08/31/22 0949   BP: (!) 144/91   Pulse: 69   Resp: 18   Body surface area is 2.52 meters squared.    Assessment/Plan:      ECOG 1    1. 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  ==  Core biopsy done 07/20/2022 completed few days back showed findings as above.  I discussed with him further management options for early stage lung cancer.  PFTs showed mildly reduced at DLCO of 70% predicted likely due to active smoking otherwise- normal profile.  I will make referral to cardiothoracic surgery for further evaluation.  Ideally I would like a PET scan to be repeated since it has been more than 2 months since his prior PET scan, but even if this is not done I would like this patient to be evaluated by Cardiothoracic surgery for consideration of pathologic mediastinal lymph node evaluation and if an operable candidate to undergo surgical exploration and resection.  Based on surgical resection we will determine if there is a need for any adjuvant treatment.  For clinical stage I A 2 with negative margins only observation is indicated.   ==8/31/22: Review of PET/CT shows very minimal disease growth. Lung lesion now measuring 14 mm (12 mm prior).  He has seen cardiothoracic surgery at this time and will be scheduled for surgical resection in the coming weeks.  Will see him post surgery     2. Right intranasal lesion: WELL DIFFERENTIATED VERRUCOID SCC CAN'T CONFIRM INVASION:  == Evaluated at [facility] for nasal polyp at the end of July, s/p recision on 8/2/22.  == Continue to follow-up at [facility]        Future Appointments   Date Time Provider Department Center   9/16/2022  2:00 PM Mary Ann Isaac MD [facility] [place] [facility]  [place]       RTC in 2 weeks for MD visit to review POC after surgical evaluation       Total time spent in counseling and discussion about further management options including relevant lab work, treatment,  prognosis, medications and intended side effects was more than 60 minutes. More than 50% of the time was spent on counseling and coordination of care.  I spent a total of 60 minutes on the day of the visit.This includes face to face time and non-face to face time preparing to see the  patient (eg, review of tests), Obtaining and/or reviewing separately obtained history, Documenting clinical information in the electronic or other health record, Independently interpreting resultsand communicating results to the patient/family/caregiver, or Care coordination.

## 2022-09-09 ENCOUNTER — PATIENT MESSAGE (OUTPATIENT)
Dept: CARDIOTHORACIC SURGERY | Facility: CLINIC | Age: 72
End: 2022-09-09
Payer: MEDICARE

## 2022-09-16 ENCOUNTER — OFFICE VISIT (OUTPATIENT)
Dept: CARDIOTHORACIC SURGERY | Facility: CLINIC | Age: 72
End: 2022-09-16
Payer: OTHER GOVERNMENT

## 2022-09-16 VITALS
SYSTOLIC BLOOD PRESSURE: 122 MMHG | BODY MASS INDEX: 37.57 KG/M2 | WEIGHT: 277 LBS | DIASTOLIC BLOOD PRESSURE: 80 MMHG | HEART RATE: 75 BPM | OXYGEN SATURATION: 96 %

## 2022-09-16 DIAGNOSIS — C80.1 VERRUCOUS CARCINOMA: ICD-10-CM

## 2022-09-16 DIAGNOSIS — C34.91 NSCLC OF RIGHT LUNG: Primary | ICD-10-CM

## 2022-09-16 PROCEDURE — 99214 PR OFFICE/OUTPT VISIT, EST, LEVL IV, 30-39 MIN: ICD-10-PCS | Mod: S$GLB,,, | Performed by: THORACIC SURGERY (CARDIOTHORACIC VASCULAR SURGERY)

## 2022-09-16 PROCEDURE — 99214 OFFICE O/P EST MOD 30 MIN: CPT | Mod: S$GLB,,, | Performed by: THORACIC SURGERY (CARDIOTHORACIC VASCULAR SURGERY)

## 2022-09-16 RX ORDER — ENALAPRIL MALEATE 20 MG/1
1 TABLET ORAL DAILY
COMMUNITY
Start: 2022-05-12 | End: 2022-11-03

## 2022-09-16 RX ORDER — AMLODIPINE BESYLATE 10 MG/1
1 TABLET ORAL 2 TIMES DAILY
COMMUNITY
Start: 2022-05-12 | End: 2022-11-03

## 2022-09-16 RX ORDER — CLOPIDOGREL BISULFATE 75 MG/1
1 TABLET ORAL DAILY
COMMUNITY
Start: 2022-05-08 | End: 2022-11-03

## 2022-09-16 RX ORDER — METFORMIN HYDROCHLORIDE 500 MG/1
1 TABLET, EXTENDED RELEASE ORAL 2 TIMES DAILY
COMMUNITY
Start: 2022-02-15

## 2022-09-19 LAB
ABS NRBC COUNT: 0 THOU/UL (ref 0–0.01)
ABSOLUTE BASOPHIL: 0.1 10*3/UL (ref 0–0.3)
ABSOLUTE EOSINOPHIL: 0.1 10*3/UL (ref 0–0.6)
ABSOLUTE IMMATURE GRAN: 0.02 THOU/UL (ref 0–0.03)
ABSOLUTE LYMPHOCYTE: 2.3 10*3/UL (ref 1.2–4)
ABSOLUTE MONOCYTE: 0.3 10*3/UL (ref 0.1–0.8)
ALBUMIN SERPL BCP-MCNC: 3.6 G/DL (ref 3.4–5)
ALP SERPL-CCNC: 87 U/L (ref 45–117)
ALT SERPL W P-5'-P-CCNC: 23 U/L (ref 16–61)
ANION GAP SERPL CALC-SCNC: 7 MMOL/L (ref 3–11)
APPEARANCE, UA: CLEAR
APTT PPP: 38.6 SEC (ref 25.7–36.7)
AST SERPL-CCNC: 15 U/L (ref 15–37)
BACTERIA SPEC CULT: ABNORMAL /HPF
BASOPHILS NFR BLD: 0.7 % (ref 0–3)
BILIRUB SERPL-MCNC: 0.4 MG/DL (ref 0.2–1)
BILIRUB UR QL STRIP: NEGATIVE
BUN SERPL-MCNC: 9 MG/DL (ref 7–18)
BUN/CREAT SERPL: 9.67 RATIO
CALCIUM SERPL-MCNC: 8.9 MG/DL (ref 8.5–10.1)
CHLORIDE SERPL-SCNC: 104 MMOL/L (ref 98–107)
CO2 SERPL-SCNC: 29 MMOL/L (ref 21–32)
COLOR UR: YELLOW
CREAT SERPL-MCNC: 0.93 MG/DL (ref 0.7–1.3)
EOSINOPHIL NFR BLD: 1.2 % (ref 0–6)
ERYTHROCYTE [DISTWIDTH] IN BLOOD BY AUTOMATED COUNT: 13.3 % (ref 0–15.5)
ESTIMATED AVG GLUCOSE: 151 MG/DL
GFR ESTIMATION: > 60
GLUCOSE (UA): NEGATIVE MG/DL
GLUCOSE SERPL-MCNC: 167 MG/DL (ref 74–106)
HBA1C MFR BLD: 6.4 % (ref 4.2–6.3)
HCT VFR BLD AUTO: 44.9 % (ref 42–52)
HGB BLD-MCNC: 14.1 G/DL (ref 14–18)
HGB UR QL STRIP: NEGATIVE
IMMATURE GRANULOCYTES: 0.3 % (ref 0–0.43)
INR PPP: 1.1 INR (ref 0.9–1.1)
KETONES UR QL STRIP: NEGATIVE MG/DL
LEUKOCYTE ESTERASE UR QL STRIP: NEGATIVE LEU/UL
LYMPHOCYTES NFR BLD: 32 % (ref 20–45)
MCH RBC QN AUTO: 27 PG (ref 27–32)
MCHC RBC AUTO-ENTMCNC: 31.4 % (ref 32–36)
MCV RBC AUTO: 86 FL (ref 80–97)
MONOCYTES NFR BLD: 4.5 % (ref 2–10)
MUCUS URINE: ABNORMAL /LPF
NEUTROPHILS # BLD AUTO: 4.5 10*3/UL (ref 1.4–7)
NEUTROPHILS NFR BLD: 61.3 % (ref 50–80)
NITRITE UR QL STRIP: NEGATIVE
NUCLEATED RED BLOOD CELLS: 0 % (ref 0–0.2)
PH UR STRIP: 6 PH (ref 5–8)
PLATELETS: 199 10*3/UL (ref 130–400)
PMV BLD AUTO: 9.8 FL (ref 9.2–12.2)
POTASSIUM SERPL-SCNC: 4.4 MMOL/L (ref 3.5–5.1)
PROT SERPL-MCNC: 7.2 G/DL (ref 6.4–8.2)
PROT UR QL STRIP: 30 MG/DL
PROTHROMBIN TIME: 12.2 SEC (ref 10.2–12.9)
RBC # BLD AUTO: 5.22 10*6/UL (ref 4.7–6.1)
RBC #/AREA URNS HPF: ABNORMAL /HPF (ref 0–2)
SERVICE COMMENT 03: ABNORMAL
SODIUM BLD-SCNC: 140 MMOL/L (ref 131–143)
SP GR UR STRIP: 1.02 (ref 1–1.03)
SQUAMOUS EPITHELIAL, UA: ABNORMAL /LPF
TSH SERPL DL<=0.005 MIU/L-ACNC: 3.1 UIU/ML (ref 0.36–3.74)
UROBILINOGEN UR STRIP-ACNC: NORMAL MG/DL
WBC # BLD: 7.3 10*3/UL (ref 4.5–10)
WBC #/AREA URNS HPF: ABNORMAL /HPF (ref 0–2)

## 2022-09-19 NOTE — PROGRESS NOTES
..  Subjective:      Patient ID: Marcia Gregory is a 71 y.o. male who presents for pre op to discuss surgery date for Right upper lobe pulmonary nodule.     Chief Complaint: Right upper lobe pulmonary nodule, review testing and schedule his procedure date.  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 Marcia has been holding Plavix for one day in preparation for  his procedure.  Denies SOB, dyspnea, cough, weight loss , appetite changes,  chills, fever, or fatigue.  Current every day smoker, 1 PPD for about 40 years, does not chew or dip . States he is a  and has been exposed to multiple chemical agents in the past .  Reports sinus surgery 3 weeks ago to remove a nasal mass at [facility], he is not scheduled for chemotherapy or radiation, he has a follow up scheduled with them next week.  History of cigarette smoker > 30 years, DVT, CAD s/p  MI about 10 years ago ( stent placed ), HTN, HL, DM, knee replacement and tonsillectomy.  Family history of heart failure, cancer, DM.  His risk factors include: 71 year old male,  current every day smoker, DM, CAD, HTN, HL, DM and family history of cancer.    Review of Systems   All other systems reviewed and are negative.   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    Skin cancer       Past Surgical History:   Procedure Laterality Date    cardiac stents      KNEE SURGERY      TONSILLECTOMY        Family History   Problem Relation Age of Onset    Heart failure Father     Cancer Sister     Breast cancer Sister     Diabetes Brother       Social History     Socioeconomic History    Marital status:    Tobacco Use    Smoking status:  Every Day     Packs/day: 1.00     Years: 40.00     Pack years: 40.00     Types: Cigarettes    Smokeless tobacco: Never    Tobacco comments:     Patient states he is cutting down 6-7 cigarettes a day   Substance and Sexual Activity    Alcohol use: Not Currently     Comment: maybe once a year    Drug use: Never        Medication List with Changes/Refills   Current Medications    AMLODIPINE (NORVASC) 10 MG TABLET    Take 10 mg by mouth once daily.    AMLODIPINE (NORVASC) 10 MG TABLET    Take 1 tablet by mouth 2 (two) times daily.    ASPIRIN (ECOTRIN) 81 MG EC TABLET    Take 81 mg by mouth once daily.    CLOPIDOGREL (PLAVIX) 75 MG TABLET    Take 75 mg by mouth once daily.    CLOPIDOGREL (PLAVIX) 75 MG TABLET    Take 1 tablet by mouth once daily.    ENALAPRIL (VASOTEC) 20 MG TABLET    Take 20 mg by mouth once daily.    ENALAPRIL (VASOTEC) 20 MG TABLET    Take 1 tablet by mouth once daily.    METFORMIN (GLUCOPHAGE) 500 MG TABLET    Take 500 mg by mouth daily with breakfast.    METFORMIN (GLUCOPHAGE-XR) 500 MG ER 24HR TABLET    Take 1 tablet by mouth 2 (two) times daily.    METOPROLOL SUCCINATE (TOPROL-XL) 50 MG 24 HR TABLET    Take 50 mg by mouth once daily.    NICOTINE (NICODERM CQ) 21 MG/24 HR    Place 1 patch onto the skin once daily.    NICOTINE POLACRILEX (NICORETTE) 4 MG GUM    Take 1 each (4 mg total) by mouth as needed (Do not use more than 10 pieces in 24 hours).    OMEPRAZOLE (PRILOSEC) 40 MG CAPSULE    Take 40 mg by mouth once daily.    SIMVASTATIN (ZOCOR) 40 MG TABLET    Take 40 mg by mouth every evening.        Objective:     /80 (BP Location: Left arm)   Pulse 75   Wt 125.6 kg (277 lb)   SpO2 96%   BMI 37.57 kg/m²     Physical Exam  Constitutional:       General: He is not in acute distress.     Appearance: Normal appearance. He is obese. He is not ill-appearing, toxic-appearing or diaphoretic.   HENT:      Head: Normocephalic and atraumatic.      Nose: Nose normal.      Mouth/Throat:      Mouth:  Mucous membranes are dry.   Eyes:      Extraocular Movements: Extraocular movements intact.      Conjunctiva/sclera: Conjunctivae normal.      Pupils: Pupils are equal, round, and reactive to light.   Cardiovascular:      Rate and Rhythm: Tachycardia present. Rhythm irregular.      Pulses: Normal pulses.      Heart sounds: Normal heart sounds. No murmur heard.    No gallop.   Pulmonary:      Effort: Pulmonary effort is normal. No respiratory distress.      Breath sounds: Normal breath sounds. No wheezing, rhonchi or rales.   Chest:      Chest wall: No tenderness.   Abdominal:      General: Abdomen is flat. Bowel sounds are normal. There is no distension.      Palpations: Abdomen is soft.      Tenderness: There is no abdominal tenderness. There is no right CVA tenderness or guarding.   Musculoskeletal:         General: No swelling, tenderness, deformity or signs of injury. Normal range of motion.      Cervical back: Normal range of motion.      Right lower leg: No edema.      Left lower leg: No edema.   Skin:     General: Skin is warm and dry.      Capillary Refill: Capillary refill takes more than 3 seconds.      Findings: No bruising or erythema.   Neurological:      General: No focal deficit present.      Mental Status: He is alert. Mental status is at baseline. He is disoriented.      Sensory: No sensory deficit.      Motor: No weakness.      Coordination: Coordination normal.      Gait: Gait normal.   Psychiatric:         Mood and Affect: Mood normal.      Dobutamine stress test 8/25/22: Normal dobutamine stress echocardiogram, no evidence of inducible ischemia.  Clinically negative for angina. Electrocardiographically negative for evidence of inducible ischemia.  There were occasional PVCs at baseline, more frequent PVCs during dobutamine infusion, no sustained arrhythmia.  Hemodynamic response to dobutamine as appropriate. Impressions:Normal dobutamine stress study.    Surgical Path report 7/19/22: FINAL  DIAGNOSIS: RIGHT UPPER LOBE OF LUNG, NODULE, CORE BIOPSY:   - MODERATELY DIFFERENTIATED SQUAMOUS CELL CARCINOMA (NON-SMALL CARCINOMA).    Comment:  Immunohistochemical staining is performed upon sections from block 1A.  The tumor cells demonstrate positivity for CK5/6, p40, and p63.  Staining is negative for TTF-1 and CK7.  All associated controls stain as expected.  RIGHT UPPER LOBE LUNG NODULE, CORE BIOPSY:   - NON-SMALL CELL CARCINOMA.  Assessment & Plan:       71-year-old gentleman with right upper lobe moderately differentiated squamous cell carcinoma was evaluated for metastatic disease.  His metastatic workup was negative.  His pulmonary function tests are reasonable and support right upper lobectomy.  The risks and benefits of surgery were explained to the patient.  The risks include but not limited to bleeding, infection, bronchopleural fistula, empyema respiratory failure and death.  He understood all risks and decided to pursue surgery

## 2022-09-21 LAB
% REDUCED HEMOGLOBIN, ARTERIAL: 3.7 %
% REDUCED HEMOGLOBIN, ARTERIAL: 4 %
ABG COLLECTION SITE: ABNORMAL
ABG COLLECTION SITE: ABNORMAL
ABG SOURCE: ABNORMAL
ABG SOURCE: ABNORMAL
ABS NRBC COUNT: 0 THOU/UL (ref 0–0.01)
ABSOLUTE BASOPHIL: 0.1 10*3/UL (ref 0–0.3)
ABSOLUTE EOSINOPHIL: 0.1 10*3/UL (ref 0–0.6)
ABSOLUTE IMMATURE GRAN: 0.07 THOU/UL (ref 0–0.03)
ABSOLUTE LYMPHOCYTE: 3.3 10*3/UL (ref 1.2–4)
ABSOLUTE MONOCYTE: 0.7 10*3/UL (ref 0.1–0.8)
ALLEN'S TEST ART: ABNORMAL
ALLEN'S TEST ART: ABNORMAL
ANALYZER ART: ABNORMAL
ANALYZER ART: ABNORMAL
ANION GAP SERPL CALC-SCNC: 5 MMOL/L (ref 3–11)
BASE EXCESS ARTERIAL: -1.9 MMOL/L (ref -2–2)
BASE EXCESS ARTERIAL: -4.2 MMOL/L (ref -2–2)
BASOPHILS NFR BLD: 0.4 % (ref 0–3)
BLOOD FLOW, ARTERIAL: 8
BUN SERPL-MCNC: 12 MG/DL (ref 7–18)
BUN/CREAT SERPL: 15.38 RATIO
CALCIUM BLD-MCNC: 1.17 MMOL/L (ref 1.13–1.33)
CALCIUM SERPL-MCNC: 8.3 MG/DL (ref 8.5–10.1)
CARBOXYHEMOGLOBIN ARTERIAL: 2.1 % (ref 0–3)
CARBOXYHEMOGLOBIN ARTERIAL: 2.5 % (ref 0–3)
CHLORIDE SERPL-SCNC: 108 MMOL/L (ref 98–107)
CO2 SERPL-SCNC: 25 MMOL/L (ref 21–32)
COMM LOG DOCUMENTATION: ABNORMAL
COMM LOG DOCUMENTATION: ABNORMAL
CREAT SERPL-MCNC: 0.78 MG/DL (ref 0.7–1.3)
EOSINOPHIL NFR BLD: 0.3 % (ref 0–6)
ERYTHROCYTE [DISTWIDTH] IN BLOOD BY AUTOMATED COUNT: 13.5 % (ref 0–15.5)
FIO2 ART: 100 %
GFR ESTIMATION: > 60
GLUCOSE BLOOD ARTERIAL: 117 MG/DL (ref 65–95)
GLUCOSE SERPL-MCNC: 108 MG/DL (ref 70–105)
GLUCOSE SERPL-MCNC: 177 MG/DL (ref 74–106)
HCO3 ARTERIAL: 22.8 MMHG (ref 22–28)
HCO3 ARTERIAL: 23.4 MMHG (ref 22–28)
HCT VFR BLD AUTO: 44.8 % (ref 42–52)
HGB BLD-MCNC: 14.2 G/DL (ref 14–18)
IMMATURE GRANULOCYTES: 0.4 % (ref 0–0.43)
LYMPHOCYTES NFR BLD: 18 % (ref 20–45)
MCH RBC QN AUTO: 27 PG (ref 27–32)
MCHC RBC AUTO-ENTMCNC: 31.7 % (ref 32–36)
MCV RBC AUTO: 85.3 FL (ref 80–97)
METHEMOGLOBIN: 0.4 % (ref 0–3)
METHEMOGLOBIN: 0.5 % (ref 0–3)
MODE ART: ABNORMAL
MODE ART: ABNORMAL
MONOCYTES NFR BLD: 3.7 % (ref 2–10)
NEUTROPHILS # BLD AUTO: 14 10*3/UL (ref 1.4–7)
NEUTROPHILS NFR BLD: 77.2 % (ref 50–80)
NOTIFIED: ABNORMAL
NOTIFIED: ABNORMAL
NUCLEATED RED BLOOD CELLS: 0 % (ref 0–0.2)
O2 HGB ARTERIAL: 93.3 % (ref 93–100)
O2 HGB ARTERIAL: 93.5 % (ref 93–100)
O2 SATURATION ARTERIAL: 95.9 %
O2 SATURATION ARTERIAL: 96.2 %
PCO2 ARTERIAL: 42.1 MMHG (ref 35–45)
PCO2 ARTERIAL: 49.2 MMHG (ref 35–45)
PERIPHERAL SMEAR: NORMAL
PH ARTERIAL: 7.28 (ref 7.35–7.45)
PH ARTERIAL: 7.36 (ref 7.35–7.45)
PLATELETS: 237 10*3/UL (ref 130–400)
PMV BLD AUTO: 9.6 FL (ref 9.2–12.2)
PO2 ARTERIAL: 82 MMHG (ref 70–100)
PO2 ARTERIAL: 82.3 MMHG (ref 70–100)
POTASSIUM BLOOD ARTERIAL: 4.22 MMOL/L (ref 3.5–4.5)
POTASSIUM SERPL-SCNC: 3.8 MMOL/L (ref 3.5–5.1)
RBC # BLD AUTO: 5.25 10*6/UL (ref 4.7–6.1)
SODIUM BLD-SCNC: 138 MMOL/L (ref 131–143)
TECH ID: ABNORMAL
TECH ID: ABNORMAL
WBC # BLD: 18.2 10*3/UL (ref 4.5–10)

## 2022-09-22 LAB
% REDUCED HEMOGLOBIN, ARTERIAL: 3.3 %
ABG COLLECTION SITE: ABNORMAL
ABG SOURCE: ABNORMAL
ABS NRBC COUNT: 0 THOU/UL (ref 0–0.01)
ABSOLUTE BASOPHIL: 0 10*3/UL (ref 0–0.3)
ABSOLUTE EOSINOPHIL: 0 10*3/UL (ref 0–0.6)
ABSOLUTE IMMATURE GRAN: 0.04 THOU/UL (ref 0–0.03)
ABSOLUTE LYMPHOCYTE: 1.3 10*3/UL (ref 1.2–4)
ABSOLUTE MONOCYTE: 0.7 10*3/UL (ref 0.1–0.8)
ALLEN'S TEST ART: ABNORMAL
ANALYZER ART: ABNORMAL
ANION GAP SERPL CALC-SCNC: 4 MMOL/L (ref 3–11)
BASE EXCESS ARTERIAL: -3.8 MMOL/L (ref -2–2)
BASOPHILS NFR BLD: 0.2 % (ref 0–3)
BLOOD FLOW, ARTERIAL: 5
BLOOD GAS COMMENTS: ABNORMAL
BUN SERPL-MCNC: 11 MG/DL (ref 7–18)
BUN/CREAT SERPL: 13.58 RATIO
CALCIUM SERPL-MCNC: 7.8 MG/DL (ref 8.5–10.1)
CARBOXYHEMOGLOBIN ARTERIAL: 1.6 % (ref 0–3)
CHLORIDE SERPL-SCNC: 104 MMOL/L (ref 98–107)
CO2 SERPL-SCNC: 26 MMOL/L (ref 21–32)
COMM LOG DOCUMENTATION: ABNORMAL
CREAT SERPL-MCNC: 0.81 MG/DL (ref 0.7–1.3)
EOSINOPHIL NFR BLD: 0.2 % (ref 0–6)
ERYTHROCYTE [DISTWIDTH] IN BLOOD BY AUTOMATED COUNT: 13.8 % (ref 0–15.5)
GFR ESTIMATION: > 60
GLUCOSE SERPL-MCNC: 129 MG/DL (ref 70–105)
GLUCOSE SERPL-MCNC: 144 MG/DL (ref 70–105)
GLUCOSE SERPL-MCNC: 151 MG/DL (ref 70–105)
GLUCOSE SERPL-MCNC: 163 MG/DL (ref 70–105)
GLUCOSE SERPL-MCNC: 173 MG/DL (ref 74–106)
HCO3 ARTERIAL: 22.7 MMHG (ref 22–28)
HCT VFR BLD AUTO: 42.5 % (ref 42–52)
HGB BLD-MCNC: 13.3 G/DL (ref 14–18)
IMMATURE GRANULOCYTES: 0.3 % (ref 0–0.43)
LYMPHOCYTES NFR BLD: 10.3 % (ref 20–45)
MCH RBC QN AUTO: 27.3 PG (ref 27–32)
MCHC RBC AUTO-ENTMCNC: 31.3 % (ref 32–36)
MCV RBC AUTO: 87.3 FL (ref 80–97)
METHEMOGLOBIN: 0.5 % (ref 0–3)
MODE ART: ABNORMAL
MONOCYTES NFR BLD: 5.5 % (ref 2–10)
NEUTROPHILS # BLD AUTO: 10.3 10*3/UL (ref 1.4–7)
NEUTROPHILS NFR BLD: 83.5 % (ref 50–80)
NOTIFIED: ABNORMAL
NUCLEATED RED BLOOD CELLS: 0 % (ref 0–0.2)
O2 HGB ARTERIAL: 94.6 % (ref 93–100)
O2 SATURATION ARTERIAL: 96.6 %
PCO2 ARTERIAL: 46.7 MMHG (ref 35–45)
PH ARTERIAL: 7.3 (ref 7.35–7.45)
PLATELETS: 184 10*3/UL (ref 130–400)
PMV BLD AUTO: 9.8 FL (ref 9.2–12.2)
PO2 ARTERIAL: 83.9 MMHG (ref 70–100)
POTASSIUM SERPL-SCNC: 4.5 MMOL/L (ref 3.5–5.1)
RBC # BLD AUTO: 4.87 10*6/UL (ref 4.7–6.1)
SODIUM BLD-SCNC: 134 MMOL/L (ref 131–143)
TECH ID: ABNORMAL
WBC # BLD: 12.4 10*3/UL (ref 4.5–10)

## 2022-09-23 LAB
ABS NRBC COUNT: 0 THOU/UL (ref 0–0.01)
ANION GAP SERPL CALC-SCNC: 4 MMOL/L (ref 3–11)
BUN SERPL-MCNC: 16 MG/DL (ref 7–18)
BUN/CREAT SERPL: 17.2 RATIO
CALCIUM SERPL-MCNC: 9.2 MG/DL (ref 8.5–10.1)
CHLORIDE SERPL-SCNC: 99 MMOL/L (ref 98–107)
CO2 SERPL-SCNC: 29 MMOL/L (ref 21–32)
CREAT SERPL-MCNC: 0.93 MG/DL (ref 0.7–1.3)
ERYTHROCYTE [DISTWIDTH] IN BLOOD BY AUTOMATED COUNT: 13.6 % (ref 0–15.5)
GFR ESTIMATION: > 60
GLUCOSE SERPL-MCNC: 139 MG/DL (ref 70–105)
GLUCOSE SERPL-MCNC: 153 MG/DL (ref 70–105)
GLUCOSE SERPL-MCNC: 160 MG/DL (ref 74–106)
GLUCOSE SERPL-MCNC: 162 MG/DL (ref 70–105)
GLUCOSE SERPL-MCNC: 163 MG/DL (ref 70–105)
HCT VFR BLD AUTO: 42.6 % (ref 42–52)
HGB BLD-MCNC: 13.2 G/DL (ref 14–18)
MCH RBC QN AUTO: 27.8 PG (ref 27–32)
MCHC RBC AUTO-ENTMCNC: 31 % (ref 32–36)
MCV RBC AUTO: 89.9 FL (ref 80–97)
NUCLEATED RED BLOOD CELLS: 0 % (ref 0–0.2)
PLATELETS: 166 10*3/UL (ref 130–400)
PMV BLD AUTO: 9.6 FL (ref 9.2–12.2)
POTASSIUM SERPL-SCNC: 4.7 MMOL/L (ref 3.5–5.1)
RBC # BLD AUTO: 4.74 10*6/UL (ref 4.7–6.1)
SODIUM BLD-SCNC: 132 MMOL/L (ref 131–143)
SPECIMEN TO PATHOLOGY: NORMAL
WBC # BLD: 14 10*3/UL (ref 4.5–10)

## 2022-09-24 ENCOUNTER — OUTSIDE PLACE OF SERVICE (OUTPATIENT)
Dept: CARDIOTHORACIC SURGERY | Facility: CLINIC | Age: 72
End: 2022-09-24
Payer: OTHER GOVERNMENT

## 2022-09-24 LAB
ABS NRBC COUNT: 0 THOU/UL (ref 0–0.01)
ALBUMIN SERPL BCP-MCNC: 2.4 G/DL (ref 3.4–5)
ALP SERPL-CCNC: 70 U/L (ref 45–117)
ALT SERPL W P-5'-P-CCNC: 14 U/L (ref 16–61)
AST SERPL-CCNC: 23 U/L (ref 15–37)
BILIRUB SERPL-MCNC: 0.4 MG/DL (ref 0.2–1)
BUN SERPL-MCNC: 15 MG/DL (ref 7–18)
BUN/CREAT SERPL: 24.19 RATIO
CALCIUM SERPL-MCNC: 9.3 MG/DL (ref 8.5–10.1)
CHLORIDE SERPL-SCNC: 99 MMOL/L (ref 98–107)
CO2 SERPL-SCNC: 32 MMOL/L (ref 21–32)
CREAT SERPL-MCNC: 0.62 MG/DL (ref 0.7–1.3)
ERYTHROCYTE [DISTWIDTH] IN BLOOD BY AUTOMATED COUNT: 13.5 % (ref 0–15.5)
GFR ESTIMATION: > 60
GLUCOSE SERPL-MCNC: 133 MG/DL (ref 74–106)
GLUCOSE SERPL-MCNC: 142 MG/DL (ref 70–105)
GLUCOSE SERPL-MCNC: 154 MG/DL (ref 70–105)
GLUCOSE SERPL-MCNC: 162 MG/DL (ref 70–105)
HCT VFR BLD AUTO: 36.8 % (ref 42–52)
HGB BLD-MCNC: 11.5 G/DL (ref 14–18)
MCH RBC QN AUTO: 27.4 PG (ref 27–32)
MCHC RBC AUTO-ENTMCNC: 31.3 % (ref 32–36)
MCV RBC AUTO: 87.6 FL (ref 80–97)
NUCLEATED RED BLOOD CELLS: 0 % (ref 0–0.2)
PLATELETS: 153 10*3/UL (ref 130–400)
PMV BLD AUTO: 10.1 FL (ref 9.2–12.2)
POTASSIUM SERPL-SCNC: 4.4 MMOL/L (ref 3.5–5.1)
PROT SERPL-MCNC: 6.3 G/DL (ref 6.4–8.2)
RBC # BLD AUTO: 4.2 10*6/UL (ref 4.7–6.1)
SODIUM BLD-SCNC: 131 MMOL/L (ref 131–143)
WBC # BLD: 9.7 10*3/UL (ref 4.5–10)

## 2022-09-24 PROCEDURE — 38746 PR REMOVE THOR LYMPH NODES RAD REGNL: ICD-10-PCS | Mod: ,,, | Performed by: THORACIC SURGERY (CARDIOTHORACIC VASCULAR SURGERY)

## 2022-09-24 PROCEDURE — 32480 PR REMOVAL OF LUNG,LOBECTOMY: ICD-10-PCS | Mod: RT,,, | Performed by: THORACIC SURGERY (CARDIOTHORACIC VASCULAR SURGERY)

## 2022-09-24 PROCEDURE — 38746 REMOVE THORACIC LYMPH NODES: CPT | Mod: ,,, | Performed by: THORACIC SURGERY (CARDIOTHORACIC VASCULAR SURGERY)

## 2022-09-24 PROCEDURE — 32480 PARTIAL REMOVAL OF LUNG: CPT | Mod: RT,,, | Performed by: THORACIC SURGERY (CARDIOTHORACIC VASCULAR SURGERY)

## 2022-09-25 LAB
ABS NRBC COUNT: 0 THOU/UL (ref 0–0.01)
ALBUMIN SERPL BCP-MCNC: 2.4 G/DL (ref 3.4–5)
ALP SERPL-CCNC: 72 U/L (ref 45–117)
ALT SERPL W P-5'-P-CCNC: 16 U/L (ref 16–61)
ANION GAP SERPL CALC-SCNC: 5 MMOL/L (ref 3–11)
AST SERPL-CCNC: 25 U/L (ref 15–37)
BILIRUB SERPL-MCNC: 0.6 MG/DL (ref 0.2–1)
BUN SERPL-MCNC: 16 MG/DL (ref 7–18)
BUN/CREAT SERPL: 20.51 RATIO
CALCIUM SERPL-MCNC: 9.2 MG/DL (ref 8.5–10.1)
CHLORIDE SERPL-SCNC: 99 MMOL/L (ref 98–107)
CO2 SERPL-SCNC: 31 MMOL/L (ref 21–32)
CREAT SERPL-MCNC: 0.78 MG/DL (ref 0.7–1.3)
ERYTHROCYTE [DISTWIDTH] IN BLOOD BY AUTOMATED COUNT: 13.4 % (ref 0–15.5)
GFR ESTIMATION: > 60
GLUCOSE SERPL-MCNC: 123 MG/DL (ref 70–105)
GLUCOSE SERPL-MCNC: 134 MG/DL (ref 70–105)
GLUCOSE SERPL-MCNC: 138 MG/DL (ref 74–106)
GLUCOSE SERPL-MCNC: 148 MG/DL (ref 70–105)
HCT VFR BLD AUTO: 36 % (ref 42–52)
HGB BLD-MCNC: 11.2 G/DL (ref 14–18)
MAGNESIUM SERPL-MCNC: 2.1 MG/DL (ref 1.6–2.6)
MCH RBC QN AUTO: 27.4 PG (ref 27–32)
MCHC RBC AUTO-ENTMCNC: 31.1 % (ref 32–36)
MCV RBC AUTO: 88 FL (ref 80–97)
NUCLEATED RED BLOOD CELLS: 0 % (ref 0–0.2)
PLATELETS: 180 10*3/UL (ref 130–400)
PMV BLD AUTO: 9.5 FL (ref 9.2–12.2)
POTASSIUM SERPL-SCNC: 3.9 MMOL/L (ref 3.5–5.1)
PROT SERPL-MCNC: 6.5 G/DL (ref 6.4–8.2)
RBC # BLD AUTO: 4.09 10*6/UL (ref 4.7–6.1)
SODIUM BLD-SCNC: 135 MMOL/L (ref 131–143)
WBC # BLD: 7.9 10*3/UL (ref 4.5–10)

## 2022-09-26 LAB
ABS NRBC COUNT: 0 THOU/UL (ref 0–0.01)
ABS NRBC COUNT: 0 THOU/UL (ref 0–0.01)
ABSOLUTE BASOPHIL: 0 10*3/UL (ref 0–0.3)
ABSOLUTE EOSINOPHIL: 0.1 10*3/UL (ref 0–0.6)
ABSOLUTE IMMATURE GRAN: 0.05 THOU/UL (ref 0–0.03)
ABSOLUTE LYMPHOCYTE: 2.4 10*3/UL (ref 1.2–4)
ABSOLUTE MONOCYTE: 0.8 10*3/UL (ref 0.1–0.8)
ANION GAP SERPL CALC-SCNC: 6 MMOL/L (ref 3–11)
APTT PPP: 36.4 SEC (ref 25.7–36.7)
BASOPHILS NFR BLD: 0.5 % (ref 0–3)
BUN SERPL-MCNC: 16 MG/DL (ref 7–18)
BUN/CREAT SERPL: 20.51 RATIO
CALCIUM SERPL-MCNC: 9.2 MG/DL (ref 8.5–10.1)
CHLORIDE SERPL-SCNC: 98 MMOL/L (ref 98–107)
CO2 SERPL-SCNC: 31 MMOL/L (ref 21–32)
CREAT SERPL-MCNC: 0.78 MG/DL (ref 0.7–1.3)
EOSINOPHIL NFR BLD: 1.3 % (ref 0–6)
ERYTHROCYTE [DISTWIDTH] IN BLOOD BY AUTOMATED COUNT: 13.4 % (ref 0–15.5)
ERYTHROCYTE [DISTWIDTH] IN BLOOD BY AUTOMATED COUNT: 13.4 % (ref 0–15.5)
GFR ESTIMATION: > 60
GLUCOSE SERPL-MCNC: 117 MG/DL (ref 70–105)
GLUCOSE SERPL-MCNC: 153 MG/DL (ref 70–105)
GLUCOSE SERPL-MCNC: 156 MG/DL (ref 74–106)
GLUCOSE SERPL-MCNC: 213 MG/DL (ref 70–105)
HCT VFR BLD AUTO: 39 % (ref 42–52)
HCT VFR BLD AUTO: 39 % (ref 42–52)
HGB BLD-MCNC: 12.1 G/DL (ref 14–18)
HGB BLD-MCNC: 12.1 G/DL (ref 14–18)
IMMATURE GRANULOCYTES: 0.6 % (ref 0–0.43)
LYMPHOCYTES NFR BLD: 28.2 % (ref 20–45)
MAGNESIUM SERPL-MCNC: 2 MG/DL (ref 1.6–2.6)
MCH RBC QN AUTO: 26.9 PG (ref 27–32)
MCH RBC QN AUTO: 26.9 PG (ref 27–32)
MCHC RBC AUTO-ENTMCNC: 31 % (ref 32–36)
MCHC RBC AUTO-ENTMCNC: 31 % (ref 32–36)
MCV RBC AUTO: 86.9 FL (ref 80–97)
MCV RBC AUTO: 86.9 FL (ref 80–97)
MONOCYTES NFR BLD: 9.5 % (ref 2–10)
NEUTROPHILS # BLD AUTO: 5.1 10*3/UL (ref 1.4–7)
NEUTROPHILS NFR BLD: 59.9 % (ref 50–80)
NUCLEATED RED BLOOD CELLS: 0 % (ref 0–0.2)
NUCLEATED RED BLOOD CELLS: 0 % (ref 0–0.2)
PLATELETS: 221 10*3/UL (ref 130–400)
PLATELETS: 221 10*3/UL (ref 130–400)
PMV BLD AUTO: 9.8 FL (ref 9.2–12.2)
PMV BLD AUTO: 9.8 FL (ref 9.2–12.2)
POTASSIUM SERPL-SCNC: 3.7 MMOL/L (ref 3.5–5.1)
RBC # BLD AUTO: 4.49 10*6/UL (ref 4.7–6.1)
RBC # BLD AUTO: 4.49 10*6/UL (ref 4.7–6.1)
SODIUM BLD-SCNC: 135 MMOL/L (ref 131–143)
WBC # BLD: 8.3 10*3/UL (ref 4.5–10)
WBC # BLD: 8.3 10*3/UL (ref 4.5–10)

## 2022-09-27 ENCOUNTER — OUTSIDE PLACE OF SERVICE (OUTPATIENT)
Dept: CARDIOTHORACIC SURGERY | Facility: CLINIC | Age: 72
End: 2022-09-27
Payer: MEDICARE

## 2022-09-27 LAB — GLUCOSE SERPL-MCNC: 151 MG/DL (ref 70–105)

## 2022-09-27 PROCEDURE — 99024 POSTOP FOLLOW-UP VISIT: CPT | Mod: ,,, | Performed by: THORACIC SURGERY (CARDIOTHORACIC VASCULAR SURGERY)

## 2022-09-27 PROCEDURE — 99024 PR POST-OP FOLLOW-UP VISIT: ICD-10-PCS | Mod: ,,, | Performed by: THORACIC SURGERY (CARDIOTHORACIC VASCULAR SURGERY)

## 2022-09-29 ENCOUNTER — OUTSIDE PLACE OF SERVICE (OUTPATIENT)
Dept: CARDIOTHORACIC SURGERY | Facility: CLINIC | Age: 72
End: 2022-09-29
Payer: MEDICARE

## 2022-09-29 LAB
ABG COLLECTION SITE: ABNORMAL
ABG SOURCE: ABNORMAL
ALLEN'S TEST ART: ABNORMAL
ANALYZER ART: ABNORMAL
BLOOD FLOW, ARTERIAL: 5
BLOOD GAS COMMENTS: ABNORMAL
CALCIUM BLD-MCNC: 1.11 MMOL/L (ref 1.13–1.33)
COMM LOG DOCUMENTATION: ABNORMAL
MODE ART: ABNORMAL
NOTIFIED: ABNORMAL
TECH ID: ABNORMAL

## 2022-09-29 PROCEDURE — 99024 POSTOP FOLLOW-UP VISIT: CPT | Mod: ,,, | Performed by: THORACIC SURGERY (CARDIOTHORACIC VASCULAR SURGERY)

## 2022-09-29 PROCEDURE — 99024 PR POST-OP FOLLOW-UP VISIT: ICD-10-PCS | Mod: ,,, | Performed by: THORACIC SURGERY (CARDIOTHORACIC VASCULAR SURGERY)

## 2022-09-30 ENCOUNTER — OUTSIDE PLACE OF SERVICE (OUTPATIENT)
Dept: CARDIOTHORACIC SURGERY | Facility: CLINIC | Age: 72
End: 2022-09-30
Payer: OTHER GOVERNMENT

## 2022-09-30 ENCOUNTER — OUTSIDE PLACE OF SERVICE (OUTPATIENT)
Dept: CARDIOTHORACIC SURGERY | Facility: CLINIC | Age: 72
End: 2022-09-30

## 2022-09-30 LAB
HCT VFR BLD AUTO: 24.1 % (ref 42–52)
HGB BLD-MCNC: 7.5 G/DL (ref 14–18)
MCHC RBC AUTO-ENTMCNC: 31.1 % (ref 32–36)

## 2022-09-30 PROCEDURE — 99024 PR POST-OP FOLLOW-UP VISIT: ICD-10-PCS | Mod: ,,, | Performed by: THORACIC SURGERY (CARDIOTHORACIC VASCULAR SURGERY)

## 2022-09-30 PROCEDURE — 99024 POSTOP FOLLOW-UP VISIT: CPT | Mod: ,,, | Performed by: THORACIC SURGERY (CARDIOTHORACIC VASCULAR SURGERY)

## 2022-09-30 PROCEDURE — 32601 THORACOSCOPY DIAGNOSTIC: CPT | Mod: 78,,, | Performed by: THORACIC SURGERY (CARDIOTHORACIC VASCULAR SURGERY)

## 2022-09-30 PROCEDURE — 32601 PR THORACOSCOPY,DX NO BX: ICD-10-PCS | Mod: 78,,, | Performed by: THORACIC SURGERY (CARDIOTHORACIC VASCULAR SURGERY)

## 2022-10-01 ENCOUNTER — OUTSIDE PLACE OF SERVICE (OUTPATIENT)
Dept: PULMONOLOGY | Facility: CLINIC | Age: 72
End: 2022-10-01

## 2022-10-01 ENCOUNTER — OUTSIDE PLACE OF SERVICE (OUTPATIENT)
Dept: PULMONOLOGY | Facility: CLINIC | Age: 72
End: 2022-10-01
Payer: OTHER GOVERNMENT

## 2022-10-01 ENCOUNTER — OUTSIDE PLACE OF SERVICE (OUTPATIENT)
Dept: CARDIOTHORACIC SURGERY | Facility: CLINIC | Age: 72
End: 2022-10-01
Payer: MEDICARE

## 2022-10-01 PROCEDURE — 99024 PR POST-OP FOLLOW-UP VISIT: ICD-10-PCS | Mod: ,,, | Performed by: THORACIC SURGERY (CARDIOTHORACIC VASCULAR SURGERY)

## 2022-10-01 PROCEDURE — 99291 PR CRITICAL CARE, E/M 30-74 MINUTES: ICD-10-PCS | Mod: 25,,, | Performed by: INTERNAL MEDICINE

## 2022-10-01 PROCEDURE — 99291 CRITICAL CARE FIRST HOUR: CPT | Mod: 25,,, | Performed by: INTERNAL MEDICINE

## 2022-10-01 PROCEDURE — 99024 POSTOP FOLLOW-UP VISIT: CPT | Mod: ,,, | Performed by: THORACIC SURGERY (CARDIOTHORACIC VASCULAR SURGERY)

## 2022-10-01 PROCEDURE — 31622 DX BRONCHOSCOPE/WASH: CPT | Mod: ,,, | Performed by: INTERNAL MEDICINE

## 2022-10-01 PROCEDURE — 31622 PR BRONCHOSCOPY,DIAGNOSTIC: ICD-10-PCS | Mod: ,,, | Performed by: INTERNAL MEDICINE

## 2022-10-02 ENCOUNTER — OUTSIDE PLACE OF SERVICE (OUTPATIENT)
Dept: CARDIOTHORACIC SURGERY | Facility: CLINIC | Age: 72
End: 2022-10-02
Payer: MEDICARE

## 2022-10-02 ENCOUNTER — OUTSIDE PLACE OF SERVICE (OUTPATIENT)
Dept: PULMONOLOGY | Facility: CLINIC | Age: 72
End: 2022-10-02
Payer: OTHER GOVERNMENT

## 2022-10-02 PROCEDURE — 99291 PR CRITICAL CARE, E/M 30-74 MINUTES: ICD-10-PCS | Mod: ,,, | Performed by: INTERNAL MEDICINE

## 2022-10-02 PROCEDURE — 99291 CRITICAL CARE FIRST HOUR: CPT | Mod: ,,, | Performed by: INTERNAL MEDICINE

## 2022-10-02 PROCEDURE — 99024 PR POST-OP FOLLOW-UP VISIT: ICD-10-PCS | Mod: ,,, | Performed by: THORACIC SURGERY (CARDIOTHORACIC VASCULAR SURGERY)

## 2022-10-02 PROCEDURE — 99024 POSTOP FOLLOW-UP VISIT: CPT | Mod: ,,, | Performed by: THORACIC SURGERY (CARDIOTHORACIC VASCULAR SURGERY)

## 2022-10-03 ENCOUNTER — OUTSIDE PLACE OF SERVICE (OUTPATIENT)
Dept: CARDIOTHORACIC SURGERY | Facility: CLINIC | Age: 72
End: 2022-10-03
Payer: MEDICARE

## 2022-10-03 PROCEDURE — 99024 PR POST-OP FOLLOW-UP VISIT: ICD-10-PCS | Mod: ,,, | Performed by: THORACIC SURGERY (CARDIOTHORACIC VASCULAR SURGERY)

## 2022-10-03 PROCEDURE — 99024 POSTOP FOLLOW-UP VISIT: CPT | Mod: ,,, | Performed by: THORACIC SURGERY (CARDIOTHORACIC VASCULAR SURGERY)

## 2022-10-04 ENCOUNTER — OUTSIDE PLACE OF SERVICE (OUTPATIENT)
Dept: CARDIOTHORACIC SURGERY | Facility: CLINIC | Age: 72
End: 2022-10-04

## 2022-10-04 PROCEDURE — 99024 PR POST-OP FOLLOW-UP VISIT: ICD-10-PCS | Mod: ,,, | Performed by: THORACIC SURGERY (CARDIOTHORACIC VASCULAR SURGERY)

## 2022-10-04 PROCEDURE — 99024 POSTOP FOLLOW-UP VISIT: CPT | Mod: ,,, | Performed by: THORACIC SURGERY (CARDIOTHORACIC VASCULAR SURGERY)

## 2022-10-05 ENCOUNTER — OUTSIDE PLACE OF SERVICE (OUTPATIENT)
Dept: CARDIOTHORACIC SURGERY | Facility: CLINIC | Age: 72
End: 2022-10-05
Payer: MEDICARE

## 2022-10-05 LAB
ABS NRBC COUNT: 0 THOU/UL (ref 0–0.01)
ABSOLUTE BASOPHIL: 0.1 10*3/UL (ref 0–0.3)
ABSOLUTE EOSINOPHIL: 0.1 10*3/UL (ref 0–0.6)
ABSOLUTE IMMATURE GRAN: 0.05 THOU/UL (ref 0–0.03)
ABSOLUTE LYMPHOCYTE: 1.4 10*3/UL (ref 1.2–4)
ABSOLUTE MONOCYTE: 0.4 10*3/UL (ref 0.1–0.8)
ANION GAP SERPL CALC-SCNC: 6 MMOL/L (ref 3–11)
BASOPHILS NFR BLD: 0.9 % (ref 0–3)
BUN SERPL-MCNC: 11 MG/DL (ref 7–18)
BUN/CREAT SERPL: 18.33 RATIO
CALCIUM SERPL-MCNC: 7.8 MG/DL (ref 8.5–10.1)
CHLORIDE SERPL-SCNC: 106 MMOL/L (ref 98–107)
CO2 SERPL-SCNC: 26 MMOL/L (ref 21–32)
CREAT SERPL-MCNC: 0.6 MG/DL (ref 0.7–1.3)
EOSINOPHIL NFR BLD: 1.6 % (ref 0–6)
ERYTHROCYTE [DISTWIDTH] IN BLOOD BY AUTOMATED COUNT: 14 % (ref 0–15.5)
GFR ESTIMATION: > 60
GLUCOSE SERPL-MCNC: 141 MG/DL (ref 74–106)
HCT VFR BLD AUTO: 29.1 % (ref 42–52)
HGB BLD-MCNC: 9 G/DL (ref 14–18)
IMMATURE GRANULOCYTES: 0.7 % (ref 0–0.43)
LYMPHOCYTES NFR BLD: 20.1 % (ref 20–45)
MCH RBC QN AUTO: 27.5 PG (ref 27–32)
MCHC RBC AUTO-ENTMCNC: 30.9 % (ref 32–36)
MCV RBC AUTO: 89 FL (ref 80–97)
MONOCYTES NFR BLD: 6.2 % (ref 2–10)
NEUTROPHILS # BLD AUTO: 4.9 10*3/UL (ref 1.4–7)
NEUTROPHILS NFR BLD: 70.5 % (ref 50–80)
NUCLEATED RED BLOOD CELLS: 0 % (ref 0–0.2)
PLATELETS: 333 10*3/UL (ref 130–400)
PMV BLD AUTO: 9.3 FL (ref 9.2–12.2)
POTASSIUM SERPL-SCNC: 3.6 MMOL/L (ref 3.5–5.1)
RBC # BLD AUTO: 3.27 10*6/UL (ref 4.7–6.1)
SODIUM BLD-SCNC: 138 MMOL/L (ref 131–143)
VANCOMYCIN: 15.5 UG/ML
WBC # BLD: 7 10*3/UL (ref 4.5–10)

## 2022-10-05 PROCEDURE — 99024 PR POST-OP FOLLOW-UP VISIT: ICD-10-PCS | Mod: ,,, | Performed by: THORACIC SURGERY (CARDIOTHORACIC VASCULAR SURGERY)

## 2022-10-05 PROCEDURE — 99024 POSTOP FOLLOW-UP VISIT: CPT | Mod: ,,, | Performed by: THORACIC SURGERY (CARDIOTHORACIC VASCULAR SURGERY)

## 2022-10-06 ENCOUNTER — OUTSIDE PLACE OF SERVICE (OUTPATIENT)
Dept: PULMONOLOGY | Facility: CLINIC | Age: 72
End: 2022-10-06
Payer: OTHER GOVERNMENT

## 2022-10-06 ENCOUNTER — OUTSIDE PLACE OF SERVICE (OUTPATIENT)
Dept: CARDIOTHORACIC SURGERY | Facility: CLINIC | Age: 72
End: 2022-10-06
Payer: MEDICARE

## 2022-10-06 LAB
ABS NRBC COUNT: 0 THOU/UL (ref 0–0.01)
ABSOLUTE BASOPHIL: 0 10*3/UL (ref 0–0.3)
ABSOLUTE EOSINOPHIL: 0.1 10*3/UL (ref 0–0.6)
ABSOLUTE IMMATURE GRAN: 0.04 THOU/UL (ref 0–0.03)
ABSOLUTE LYMPHOCYTE: 1.4 10*3/UL (ref 1.2–4)
ABSOLUTE MONOCYTE: 0.5 10*3/UL (ref 0.1–0.8)
ANION GAP SERPL CALC-SCNC: 6 MMOL/L (ref 3–11)
BASOPHILS NFR BLD: 0.4 % (ref 0–3)
BUN SERPL-MCNC: 13 MG/DL (ref 7–18)
BUN/CREAT SERPL: 18.84 RATIO
CALCIUM SERPL-MCNC: 7.7 MG/DL (ref 8.5–10.1)
CHLORIDE SERPL-SCNC: 104 MMOL/L (ref 98–107)
CO2 SERPL-SCNC: 26 MMOL/L (ref 21–32)
CREAT SERPL-MCNC: 0.69 MG/DL (ref 0.7–1.3)
EOSINOPHIL NFR BLD: 1.7 % (ref 0–6)
ERYTHROCYTE [DISTWIDTH] IN BLOOD BY AUTOMATED COUNT: 13.4 % (ref 0–15.5)
GFR ESTIMATION: > 60
GLUCOSE SERPL-MCNC: 138 MG/DL (ref 74–106)
HCT VFR BLD AUTO: 29.6 % (ref 42–52)
HGB BLD-MCNC: 9.1 G/DL (ref 14–18)
IMMATURE GRANULOCYTES: 0.6 % (ref 0–0.43)
LYMPHOCYTES NFR BLD: 20.5 % (ref 20–45)
MCH RBC QN AUTO: 27.2 PG (ref 27–32)
MCHC RBC AUTO-ENTMCNC: 30.7 % (ref 32–36)
MCV RBC AUTO: 88.6 FL (ref 80–97)
MONOCYTES NFR BLD: 7 % (ref 2–10)
NEUTROPHILS # BLD AUTO: 4.9 10*3/UL (ref 1.4–7)
NEUTROPHILS NFR BLD: 69.8 % (ref 50–80)
NUCLEATED RED BLOOD CELLS: 0 % (ref 0–0.2)
PLATELETS: 358 10*3/UL (ref 130–400)
PMV BLD AUTO: 9.1 FL (ref 9.2–12.2)
POTASSIUM SERPL-SCNC: 3.9 MMOL/L (ref 3.5–5.1)
RBC # BLD AUTO: 3.34 10*6/UL (ref 4.7–6.1)
SODIUM BLD-SCNC: 136 MMOL/L (ref 131–143)
WBC # BLD: 7 10*3/UL (ref 4.5–10)

## 2022-10-06 PROCEDURE — 99232 PR SUBSEQUENT HOSPITAL CARE,LEVL II: ICD-10-PCS | Mod: FS,,, | Performed by: INTERNAL MEDICINE

## 2022-10-06 PROCEDURE — 99024 POSTOP FOLLOW-UP VISIT: CPT | Mod: ,,, | Performed by: THORACIC SURGERY (CARDIOTHORACIC VASCULAR SURGERY)

## 2022-10-06 PROCEDURE — 99232 SBSQ HOSP IP/OBS MODERATE 35: CPT | Mod: FS,,, | Performed by: INTERNAL MEDICINE

## 2022-10-06 PROCEDURE — 99024 PR POST-OP FOLLOW-UP VISIT: ICD-10-PCS | Mod: ,,, | Performed by: THORACIC SURGERY (CARDIOTHORACIC VASCULAR SURGERY)

## 2022-10-07 ENCOUNTER — OUTSIDE PLACE OF SERVICE (OUTPATIENT)
Dept: CARDIOTHORACIC SURGERY | Facility: CLINIC | Age: 72
End: 2022-10-07
Payer: MEDICARE

## 2022-10-07 ENCOUNTER — OUTSIDE PLACE OF SERVICE (OUTPATIENT)
Dept: PULMONOLOGY | Facility: CLINIC | Age: 72
End: 2022-10-07
Payer: OTHER GOVERNMENT

## 2022-10-07 LAB
ABS NRBC COUNT: 0.02 THOU/UL (ref 0–0.01)
ABSOLUTE BASOPHIL: 0.1 10*3/UL (ref 0–0.3)
ABSOLUTE EOSINOPHIL: 0.2 10*3/UL (ref 0–0.6)
ABSOLUTE IMMATURE GRAN: 0.05 THOU/UL (ref 0–0.03)
ABSOLUTE LYMPHOCYTE: 1.3 10*3/UL (ref 1.2–4)
ABSOLUTE MONOCYTE: 0.5 10*3/UL (ref 0.1–0.8)
ANION GAP SERPL CALC-SCNC: 5 MMOL/L (ref 3–11)
BASOPHILS NFR BLD: 0.8 % (ref 0–3)
BUN SERPL-MCNC: 13 MG/DL (ref 7–18)
BUN/CREAT SERPL: 19.4 RATIO
CALCIUM SERPL-MCNC: 7.6 MG/DL (ref 8.5–10.1)
CHLORIDE SERPL-SCNC: 104 MMOL/L (ref 98–107)
CO2 SERPL-SCNC: 28 MMOL/L (ref 21–32)
CREAT SERPL-MCNC: 0.67 MG/DL (ref 0.7–1.3)
EOSINOPHIL NFR BLD: 2.8 % (ref 0–6)
ERYTHROCYTE [DISTWIDTH] IN BLOOD BY AUTOMATED COUNT: 13.6 % (ref 0–15.5)
GFR ESTIMATION: > 60
GLUCOSE SERPL-MCNC: 133 MG/DL (ref 74–106)
HCT VFR BLD AUTO: 28.7 % (ref 42–52)
HGB BLD-MCNC: 8.9 G/DL (ref 14–18)
IMMATURE GRANULOCYTES: 0.8 % (ref 0–0.43)
LYMPHOCYTES NFR BLD: 20.1 % (ref 20–45)
MCH RBC QN AUTO: 27.6 PG (ref 27–32)
MCHC RBC AUTO-ENTMCNC: 31 % (ref 32–36)
MCV RBC AUTO: 89.1 FL (ref 80–97)
MONOCYTES NFR BLD: 7.5 % (ref 2–10)
NEUTROPHILS # BLD AUTO: 4.3 10*3/UL (ref 1.4–7)
NEUTROPHILS NFR BLD: 68 % (ref 50–80)
NUCLEATED RED BLOOD CELLS: 0.3 % (ref 0–0.2)
PLATELETS: 315 10*3/UL (ref 130–400)
PMV BLD AUTO: 9.5 FL (ref 9.2–12.2)
POTASSIUM SERPL-SCNC: 3.7 MMOL/L (ref 3.5–5.1)
RBC # BLD AUTO: 3.22 10*6/UL (ref 4.7–6.1)
SODIUM BLD-SCNC: 137 MMOL/L (ref 131–143)
WBC # BLD: 6.4 10*3/UL (ref 4.5–10)

## 2022-10-07 PROCEDURE — 99232 SBSQ HOSP IP/OBS MODERATE 35: CPT | Mod: FS,,, | Performed by: INTERNAL MEDICINE

## 2022-10-07 PROCEDURE — 99024 POSTOP FOLLOW-UP VISIT: CPT | Mod: ,,, | Performed by: THORACIC SURGERY (CARDIOTHORACIC VASCULAR SURGERY)

## 2022-10-07 PROCEDURE — 99024 PR POST-OP FOLLOW-UP VISIT: ICD-10-PCS | Mod: ,,, | Performed by: THORACIC SURGERY (CARDIOTHORACIC VASCULAR SURGERY)

## 2022-10-07 PROCEDURE — 99232 PR SUBSEQUENT HOSPITAL CARE,LEVL II: ICD-10-PCS | Mod: FS,,, | Performed by: INTERNAL MEDICINE

## 2022-10-08 ENCOUNTER — OUTSIDE PLACE OF SERVICE (OUTPATIENT)
Dept: CARDIOTHORACIC SURGERY | Facility: CLINIC | Age: 72
End: 2022-10-08
Payer: MEDICARE

## 2022-10-08 PROCEDURE — 99024 POSTOP FOLLOW-UP VISIT: CPT | Mod: ,,, | Performed by: THORACIC SURGERY (CARDIOTHORACIC VASCULAR SURGERY)

## 2022-10-08 PROCEDURE — 99024 PR POST-OP FOLLOW-UP VISIT: ICD-10-PCS | Mod: ,,, | Performed by: THORACIC SURGERY (CARDIOTHORACIC VASCULAR SURGERY)

## 2022-10-09 ENCOUNTER — OUTSIDE PLACE OF SERVICE (OUTPATIENT)
Dept: CARDIOTHORACIC SURGERY | Facility: CLINIC | Age: 72
End: 2022-10-09
Payer: MEDICARE

## 2022-10-09 PROCEDURE — 99024 PR POST-OP FOLLOW-UP VISIT: ICD-10-PCS | Mod: ,,, | Performed by: THORACIC SURGERY (CARDIOTHORACIC VASCULAR SURGERY)

## 2022-10-09 PROCEDURE — 99024 POSTOP FOLLOW-UP VISIT: CPT | Mod: ,,, | Performed by: THORACIC SURGERY (CARDIOTHORACIC VASCULAR SURGERY)

## 2022-10-10 ENCOUNTER — OUTSIDE PLACE OF SERVICE (OUTPATIENT)
Dept: CARDIOTHORACIC SURGERY | Facility: CLINIC | Age: 72
End: 2022-10-10
Payer: MEDICARE

## 2022-10-10 PROCEDURE — 99024 PR POST-OP FOLLOW-UP VISIT: ICD-10-PCS | Mod: ,,, | Performed by: THORACIC SURGERY (CARDIOTHORACIC VASCULAR SURGERY)

## 2022-10-10 PROCEDURE — 99024 POSTOP FOLLOW-UP VISIT: CPT | Mod: ,,, | Performed by: THORACIC SURGERY (CARDIOTHORACIC VASCULAR SURGERY)

## 2022-10-11 ENCOUNTER — OUTSIDE PLACE OF SERVICE (OUTPATIENT)
Dept: CARDIOTHORACIC SURGERY | Facility: CLINIC | Age: 72
End: 2022-10-11
Payer: MEDICARE

## 2022-10-11 PROCEDURE — 99024 POSTOP FOLLOW-UP VISIT: CPT | Mod: ,,, | Performed by: THORACIC SURGERY (CARDIOTHORACIC VASCULAR SURGERY)

## 2022-10-11 PROCEDURE — 99024 PR POST-OP FOLLOW-UP VISIT: ICD-10-PCS | Mod: ,,, | Performed by: THORACIC SURGERY (CARDIOTHORACIC VASCULAR SURGERY)

## 2022-10-12 ENCOUNTER — OUTSIDE PLACE OF SERVICE (OUTPATIENT)
Dept: CARDIOTHORACIC SURGERY | Facility: CLINIC | Age: 72
End: 2022-10-12
Payer: MEDICARE

## 2022-10-12 PROCEDURE — 99024 PR POST-OP FOLLOW-UP VISIT: ICD-10-PCS | Mod: ,,, | Performed by: THORACIC SURGERY (CARDIOTHORACIC VASCULAR SURGERY)

## 2022-10-12 PROCEDURE — 99024 POSTOP FOLLOW-UP VISIT: CPT | Mod: ,,, | Performed by: THORACIC SURGERY (CARDIOTHORACIC VASCULAR SURGERY)

## 2022-10-13 ENCOUNTER — OUTSIDE PLACE OF SERVICE (OUTPATIENT)
Dept: CARDIOTHORACIC SURGERY | Facility: CLINIC | Age: 72
End: 2022-10-13
Payer: MEDICARE

## 2022-10-13 PROCEDURE — 99024 PR POST-OP FOLLOW-UP VISIT: ICD-10-PCS | Mod: ,,, | Performed by: THORACIC SURGERY (CARDIOTHORACIC VASCULAR SURGERY)

## 2022-10-13 PROCEDURE — 99024 POSTOP FOLLOW-UP VISIT: CPT | Mod: ,,, | Performed by: THORACIC SURGERY (CARDIOTHORACIC VASCULAR SURGERY)

## 2022-10-17 ENCOUNTER — PATIENT MESSAGE (OUTPATIENT)
Dept: CARDIOTHORACIC SURGERY | Facility: CLINIC | Age: 72
End: 2022-10-17

## 2022-10-18 ENCOUNTER — HOSPITAL ENCOUNTER (OUTPATIENT)
Dept: RADIOLOGY | Facility: CLINIC | Age: 72
Discharge: HOME | End: 2022-10-18
Attending: THORACIC SURGERY (CARDIOTHORACIC VASCULAR SURGERY)
Payer: OTHER GOVERNMENT

## 2022-10-18 ENCOUNTER — OFFICE VISIT (OUTPATIENT)
Dept: CARDIOTHORACIC SURGERY | Facility: CLINIC | Age: 72
End: 2022-10-18
Payer: MEDICARE

## 2022-10-18 VITALS
BODY MASS INDEX: 35.53 KG/M2 | SYSTOLIC BLOOD PRESSURE: 122 MMHG | DIASTOLIC BLOOD PRESSURE: 62 MMHG | WEIGHT: 262 LBS | OXYGEN SATURATION: 98 % | HEART RATE: 54 BPM

## 2022-10-18 DIAGNOSIS — C34.91 NSCLC OF RIGHT LUNG: ICD-10-CM

## 2022-10-18 DIAGNOSIS — C34.91 NSCLC OF RIGHT LUNG: Primary | ICD-10-CM

## 2022-10-18 PROCEDURE — 99024 POSTOP FOLLOW-UP VISIT: CPT | Mod: S$GLB,POP,, | Performed by: THORACIC SURGERY (CARDIOTHORACIC VASCULAR SURGERY)

## 2022-10-18 PROCEDURE — 99024 PR POST-OP FOLLOW-UP VISIT: ICD-10-PCS | Mod: S$GLB,POP,, | Performed by: THORACIC SURGERY (CARDIOTHORACIC VASCULAR SURGERY)

## 2022-10-18 PROCEDURE — 71046 X-RAY EXAM CHEST 2 VIEWS: CPT | Mod: 26,,, | Performed by: RADIOLOGY

## 2022-10-18 PROCEDURE — 71046 XR CHEST PA AND LATERAL: ICD-10-PCS | Mod: 26,,, | Performed by: RADIOLOGY

## 2022-10-18 RX ORDER — AMIODARONE HYDROCHLORIDE 200 MG/1
TABLET ORAL
COMMUNITY
Start: 2022-10-13 | End: 2023-06-14

## 2022-10-18 RX ORDER — PANTOPRAZOLE SODIUM 40 MG/1
TABLET, DELAYED RELEASE ORAL
COMMUNITY
Start: 2022-10-13 | End: 2023-06-14

## 2022-10-18 RX ORDER — HYDROCODONE BITARTRATE AND ACETAMINOPHEN 5; 325 MG/1; MG/1
TABLET ORAL
COMMUNITY
Start: 2022-10-13 | End: 2023-06-14

## 2022-10-18 RX ORDER — AMOXICILLIN AND CLAVULANATE POTASSIUM 875; 125 MG/1; MG/1
TABLET, FILM COATED ORAL
COMMUNITY
Start: 2022-10-13 | End: 2023-06-14

## 2022-10-20 NOTE — PROGRESS NOTES
..  Subjective:      Patient ID: Marcia Gregory is a 71 y.o. male who presents for post op evaluation   Chief Complaint: Post-op Evaluation (Post op upper lobe of Right lung)    71 year old male accompanied by his wife presents to clinic today after being re-admitted to the hospital  on 9/30/22  for hypotension. CT and labs  confirmed a  bleed , unfortunately he is was placed  on Eliquis due to A-fib  post -op his  Right upper lobe lobectomy.  He underwent an evacuation of the clotted hemothorax on 9/30/22. Discharged on 10/13/22 with portable O2 via NC at 1.5 L as needed for  SOB. He is able to take the oxygen off usually while watching TV, his O2 sat is > 90 while sitting, it does drop with exertion into the upper 80's . Reports he is improving slowly.  Denies  pain, cough,  incision drainage, chills or fever . He has not smoked since his recent procedure.   History of Right upper lobe lobectomy, evacuation of clotted hemothorax , cigarette smoker > 30 years, DVT, CAD s/p  MI about 10 years ago ( stent placed ), HTN, HL, DM, knee replacement and tonsillectomy.  Family history of heart failure, cancer, DM.  His risk factors include: 71 year old male,  current every day smoker, DM, CAD, HTN, HL, DM and family history of cancer.    Review of Systems   Constitutional: Positive for weight loss.   HENT:  Positive for hearing loss.    Respiratory:  Positive for shortness of breath.    Musculoskeletal:  Positive for muscle weakness.   Neurological:  Positive for weakness.    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    Skin cancer       Past Surgical History:   Procedure Laterality Date    cardiac stents      KNEE SURGERY      TONSILLECTOMY        Family History   Problem Relation Age of Onset    Heart failure Father     Cancer Sister     Breast cancer Sister     Diabetes  Brother       Social History     Socioeconomic History    Marital status:    Tobacco Use    Smoking status: Every Day     Packs/day: 1.00     Years: 40.00     Pack years: 40.00     Types: Cigarettes    Smokeless tobacco: Never    Tobacco comments:     Patient states he is cutting down 6-7 cigarettes a day   Substance and Sexual Activity    Alcohol use: Not Currently     Comment: maybe once a year    Drug use: Never        Medication List with Changes/Refills   Current Medications    AMIODARONE (PACERONE) 200 MG TAB        AMLODIPINE (NORVASC) 10 MG TABLET    Take 10 mg by mouth once daily.    AMLODIPINE (NORVASC) 10 MG TABLET    Take 1 tablet by mouth 2 (two) times daily.    AMOXICILLIN-CLAVULANATE 875-125MG (AUGMENTIN) 875-125 MG PER TABLET        ASPIRIN (ECOTRIN) 81 MG EC TABLET    Take 81 mg by mouth once daily.    CLOPIDOGREL (PLAVIX) 75 MG TABLET    Take 75 mg by mouth once daily.    CLOPIDOGREL (PLAVIX) 75 MG TABLET    Take 1 tablet by mouth once daily.    ENALAPRIL (VASOTEC) 20 MG TABLET    Take 20 mg by mouth once daily.    ENALAPRIL (VASOTEC) 20 MG TABLET    Take 1 tablet by mouth once daily.    HYDROCODONE-ACETAMINOPHEN (NORCO) 5-325 MG PER TABLET        METFORMIN (GLUCOPHAGE) 500 MG TABLET    Take 500 mg by mouth daily with breakfast.    METFORMIN (GLUCOPHAGE-XR) 500 MG ER 24HR TABLET    Take 1 tablet by mouth 2 (two) times daily.    METOPROLOL SUCCINATE (TOPROL-XL) 50 MG 24 HR TABLET    Take 50 mg by mouth once daily.    NICOTINE (NICODERM CQ) 21 MG/24 HR    Place 1 patch onto the skin once daily.    NICOTINE POLACRILEX (NICORETTE) 4 MG GUM    Take 1 each (4 mg total) by mouth as needed (Do not use more than 10 pieces in 24 hours).    OMEPRAZOLE (PRILOSEC) 40 MG CAPSULE    Take 40 mg by mouth once daily.    PANTOPRAZOLE (PROTONIX) 40 MG TABLET        SIMVASTATIN (ZOCOR) 40 MG TABLET    Take 40 mg by mouth every evening.        Objective:     /62 (BP Location: Right arm)   Pulse (!) 54    Wt 118.8 kg (262 lb)   SpO2 98%   BMI 35.53 kg/m²     Physical Exam  Constitutional:       General: He is not in acute distress.     Appearance: Normal appearance. He is obese. He is not ill-appearing, toxic-appearing or diaphoretic.   HENT:      Head: Normocephalic and atraumatic.      Nose: Nose normal.      Mouth/Throat:      Mouth: Mucous membranes are dry.   Eyes:      Extraocular Movements: Extraocular movements intact.      Conjunctiva/sclera: Conjunctivae normal.      Pupils: Pupils are equal, round, and reactive to light.   Cardiovascular:      Rate and Rhythm: Tachycardia present. Rhythm irregular.      Pulses: Normal pulses.      Heart sounds: Normal heart sounds. No murmur heard.    No gallop.   Pulmonary:      Effort: Pulmonary effort is normal. No respiratory distress.      Breath sounds: Normal breath sounds. No wheezing, rhonchi or rales.   Chest:      Chest wall: No tenderness.   Abdominal:      General: Abdomen is flat. Bowel sounds are normal. There is no distension.      Palpations: Abdomen is soft.      Tenderness: There is no abdominal tenderness. There is no right CVA tenderness or guarding.   Musculoskeletal:         General: No swelling, tenderness, deformity or signs of injury. Normal range of motion.      Cervical back: Normal range of motion.      Right lower leg: No edema.      Left lower leg: No edema.   Skin:     General: Skin is warm and dry.      Capillary Refill: Capillary refill takes more than 3 seconds.      Findings: No bruising or erythema.   Neurological:      General: No focal deficit present.      Mental Status: He is alert. Mental status is at baseline. He is disoriented.      Sensory: No sensory deficit.      Motor: No weakness.      Coordination: Coordination normal.      Gait: Gait normal.   Psychiatric:         Mood and Affect: Mood normal.        Labs:  CMP  Sodium   Date Value Ref Range Status   10/07/2022 137 131 - 143 mmol/L Final     Potassium   Date Value Ref  Range Status   10/07/2022 3.7 3.5 - 5.1 mmol/L Final     Chloride   Date Value Ref Range Status   10/07/2022 104 98 - 107 mmol/L Final     CO2   Date Value Ref Range Status   10/07/2022 28 21 - 32 mmol/L Final     Glucose   Date Value Ref Range Status   10/07/2022 133 (H) 74 - 106 mg/dL Final     BUN   Date Value Ref Range Status   10/07/2022 13.0 7.0 - 18.0 mg/dL Final     Creatinine   Date Value Ref Range Status   10/07/2022 0.667 (L) 0.700 - 1.30 mg/dL Final     Calcium   Date Value Ref Range Status   10/07/2022 7.6 (L) 8.5 - 10.1 mg/dL Final     Total Protein   Date Value Ref Range Status   09/25/2022 6.5 6.4 - 8.2 g/dL Final     Albumin   Date Value Ref Range Status   09/25/2022 2.4 (L) 3.4 - 5.0 g/dL Final     Total Bilirubin   Date Value Ref Range Status   09/25/2022 0.6 0.2 - 1.0 mg/dL Final     Alkaline Phosphatase   Date Value Ref Range Status   09/25/2022 72 45 - 117 U/L Final     AST   Date Value Ref Range Status   09/25/2022 25 15 - 37 U/L Final     ALT   Date Value Ref Range Status   09/25/2022 16 16 - 61 U/L Final     Anion Gap   Date Value Ref Range Status   10/07/2022 5.0 3.0 - 11.0 mmol/L Final      Lab Results   Component Value Date    ALT 16 09/25/2022    AST 25 09/25/2022    ALKPHOS 72 09/25/2022    BILITOT 0.6 09/25/2022      No results found for: PTINR, PT  Lab Results   Component Value Date    WBC 6.4 10/07/2022    HGB 8.9 (L) 10/07/2022    HCT 28.7 (L) 10/07/2022    MCV 89.1 10/07/2022    LABPLAT 315 10/07/2022         X-Ray Chest PA And Lateral  Narrative: EXAMINATION:  XR CHEST PA AND LATERAL    CLINICAL HISTORY:  Malignant neoplasm of unspecified part of right bronchus or lung    TECHNIQUE:  PA and lateral views of the chest were performed.    COMPARISON:  None    FINDINGS:  Cardiac silhouette appears borderline enlarged.    There is a moderate to large right-sided pleural effusion.  Ill-defined airspace opacities in the right mid to lower lung may represent atelectasis versus pneumonia.   Clinical correlation is recommended.  Left lung appears clear.  No acute osseous findings demonstrated.  Impression: As above    This report was flagged in Epic as abnormal.    Electronically signed by: Cortes Chavez MD  Date:    10/18/2022  Time:    14:23     Evacuation of clotted hemothorax : 9/30/22    Right upper lobe lobectomy 9/21/22    Assessment & Plan:     71-year-old gentleman who underwent right upper lobectomy and developed postop atrial fibrillation.  He was put on Eliquis following that he had hemothorax which needed laser video-assisted thoracoscopy and evacuation of the hemothorax.  Now he came for follow-up.  Chest x-ray showed well-expanded right middle and lower lobes.  Recommended to continue incentive spirometry he has been discharged from my clinic but I will be happy to see him again in case of any problems

## 2022-10-22 ENCOUNTER — PATIENT MESSAGE (OUTPATIENT)
Dept: CARDIOTHORACIC SURGERY | Facility: CLINIC | Age: 72
End: 2022-10-22
Payer: MEDICARE

## 2022-10-28 DIAGNOSIS — R91.1 LUNG NODULE: Primary | ICD-10-CM

## 2022-11-01 ENCOUNTER — TELEPHONE (OUTPATIENT)
Dept: PULMONOLOGY | Facility: CLINIC | Age: 72
End: 2022-11-01
Payer: MEDICARE

## 2022-11-01 NOTE — TELEPHONE ENCOUNTER
Voiced to pt we don't need anoth chest xray as he did one on the 18 of October for Dr. Loya. Also gave him appt details, he voiced understanding.

## 2022-11-01 NOTE — TELEPHONE ENCOUNTER
----- Message from Veronica Harper sent at 11/1/2022 11:32 AM CDT -----  Contact: Patient  Patient called to consult with nurse or staff regarding his xray orders. He is not sure if the orders have been called in and wanted to contact this office regarding this. He can be reached at [phone]. Thanks/

## 2022-11-03 ENCOUNTER — OFFICE VISIT (OUTPATIENT)
Dept: PULMONOLOGY | Facility: CLINIC | Age: 72
End: 2022-11-03
Payer: OTHER GOVERNMENT

## 2022-11-03 ENCOUNTER — CLINICAL SUPPORT (OUTPATIENT)
Dept: PULMONOLOGY | Facility: CLINIC | Age: 72
End: 2022-11-03
Payer: OTHER GOVERNMENT

## 2022-11-03 VITALS
BODY MASS INDEX: 34.94 KG/M2 | HEIGHT: 72 IN | DIASTOLIC BLOOD PRESSURE: 69 MMHG | WEIGHT: 257.94 LBS | RESPIRATION RATE: 18 BRPM | OXYGEN SATURATION: 95 % | SYSTOLIC BLOOD PRESSURE: 140 MMHG | HEART RATE: 56 BPM

## 2022-11-03 DIAGNOSIS — R94.2 DIFFUSION CAPACITY OF LUNG (DL), DECREASED: Primary | ICD-10-CM

## 2022-11-03 DIAGNOSIS — R91.1 LUNG NODULE: ICD-10-CM

## 2022-11-03 DIAGNOSIS — J96.11 CHRONIC HYPOXEMIC RESPIRATORY FAILURE: ICD-10-CM

## 2022-11-03 DIAGNOSIS — G47.34 NOCTURNAL HYPOXEMIA: ICD-10-CM

## 2022-11-03 DIAGNOSIS — Z87.891 HISTORY OF TOBACCO USE: ICD-10-CM

## 2022-11-03 DIAGNOSIS — R94.2 RESTRICTIVE PATTERN PRESENT ON PULMONARY FUNCTION TESTING: ICD-10-CM

## 2022-11-03 DIAGNOSIS — C34.91 NON-SMALL CELL CANCER OF RIGHT LUNG: ICD-10-CM

## 2022-11-03 DIAGNOSIS — G47.00 INSOMNIA, UNSPECIFIED TYPE: ICD-10-CM

## 2022-11-03 PROCEDURE — 99214 OFFICE O/P EST MOD 30 MIN: CPT | Mod: 25,S$GLB,, | Performed by: INTERNAL MEDICINE

## 2022-11-03 PROCEDURE — 94729 PR C02/MEMBANE DIFFUSE CAPACITY: ICD-10-PCS | Mod: S$GLB,,, | Performed by: INTERNAL MEDICINE

## 2022-11-03 PROCEDURE — 94726 PLETHYSMOGRAPHY LUNG VOLUMES: CPT | Mod: S$GLB,,, | Performed by: INTERNAL MEDICINE

## 2022-11-03 PROCEDURE — 94726 PULM FUNCT TST PLETHYSMOGRAP: ICD-10-PCS | Mod: S$GLB,,, | Performed by: INTERNAL MEDICINE

## 2022-11-03 PROCEDURE — 94010 BREATHING CAPACITY TEST: CPT | Mod: S$GLB,,, | Performed by: INTERNAL MEDICINE

## 2022-11-03 PROCEDURE — 94010 BREATHING CAPACITY TEST: ICD-10-PCS | Mod: S$GLB,,, | Performed by: INTERNAL MEDICINE

## 2022-11-03 PROCEDURE — 94729 DIFFUSING CAPACITY: CPT | Mod: S$GLB,,, | Performed by: INTERNAL MEDICINE

## 2022-11-03 PROCEDURE — 99214 PR OFFICE/OUTPT VISIT, EST, LEVL IV, 30-39 MIN: ICD-10-PCS | Mod: 25,S$GLB,, | Performed by: INTERNAL MEDICINE

## 2022-11-03 NOTE — PROGRESS NOTES
Subjective:    Patient Identification:   Patient ID: Marcia Gregory is a 72 y.o. male.    Referring Provider:  Established patient    Chief Complaint:  Post hospital discharge follow-up    History of Present Illness:    Marcia Gregory is a 72 y.o. male who presents for follow-up after right upper lobectomy for moderately differentiated squamous cell carcinoma with postoperative atrial fibrillation.  Patient was discharged home on Eliquis and presented back with hemothorax requiring VATS with evacuation of hemothorax and right lung atelectasis requiring bronchoscopy with mucus plug removal.  He was subsequently discharged on 10/13/2022 with supplemental oxygen.  Recently saw CT surgery in their office and was discharged from clinic.  He tells me that he walks around the length of a football field twice a day and has to stop about midway of the 2nd length due to oxygen saturation as low as 89%.  Upon putting supplemental oxygen oxygen saturation comes back right up.  He denies any breathing difficulties.  He is using his oxygen at 1.5 L per minute at nighttime.  Occasionally his wife was found him sleeping without oxygen but levels had been around 92%.  He denies any significant cough, fever, chills or chest pain.  He is able to walk at his normal pace.  He even painted is house recently without oxygen test using a mask.  Recent chest x-ray showed chronic pleural thickening with extended right lower and middle lobes.  He denies any symptoms of excessive snoring, witnessed apneas but does have some daytime fatigue and sleepiness.  He attributes it to him not having any activity due to during the daytime.  His complaint is difficulty falling asleep and then staying asleep due to extreme anxiety and some flashbacks from recent surgery related complications and trauma.  He cannot fall asleep after he goes to bed around 9-930 p.m. he thinks he takes forever to fall asleep and sometimes it would be 12 30-1 a.m..   He will wake up with anxiety and sometimes with vivid dreams about the surgical complications he had.  He feels restless.  He wakes up for good around 2-230 a.m. then would drink coffee or read old newspaper.  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  He goes back to bed and then falls asleep and wakes up around 830-9 a.m.  He has an upcoming appointment with his primary care physician and is going to ask for a sleeping aid.  He does have a follow-up appointment with his oncologist.    Review of Systems:  Review of Systems   Constitutional:  Negative for fever, chills, weight loss, weight gain, activity change, appetite change, fatigue, night sweats and weakness.   HENT:  Negative for nosebleeds, postnasal drip, rhinorrhea, sinus pressure, sore throat, trouble swallowing, voice change, congestion, ear pain and hearing loss.    Eyes:  Negative for redness and itching.   Respiratory:  Negative for cough, hemoptysis, sputum production, choking, chest tightness, shortness of breath, wheezing, orthopnea, previous hospitialization due to pulmonary problems, asthma nighttime symptoms, pleurisy, dyspnea on extertion, use of rescue inhaler and Paroxysmal Nocturnal Dyspnea.    Cardiovascular:  Negative for chest pain, palpitations and leg swelling.   Genitourinary:  Negative for difficulty urinating and hematuria.   Endocrine:  Negative for polydipsia, polyphagia, cold intolerance, heat intolerance and polyuria.    Musculoskeletal:  Negative for arthralgias, back pain, gait problem, joint swelling and myalgias.   Skin:  Negative for rash.   Gastrointestinal:  Negative for nausea, vomiting, abdominal pain, abdominal distention and acid reflux.   Neurological:  Negative for dizziness, syncope, weakness, light-headedness and headaches.   Hematological:  Negative for adenopathy. Does not bruise/bleed easily and no excessive bruising.   Psychiatric/Behavioral:  Negative for confusion and sleep  disturbance. The patient is not nervous/anxious.        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 He appears not cachectic. No distress. He is obese.   HENT:   Head: Normocephalic.   Right Ear: External ear normal.   Left Ear: External ear normal.   Nose: Nose normal. No mucosal edema. No polyps.   Mouth/Throat: Oropharynx is clear and moist. Normal dentition. No oropharyngeal exudate. Mallampati Score: III.   Neck: No JVD present. No tracheal deviation present. No thyromegaly present.   Cardiovascular: Normal rate, regular rhythm, normal heart sounds and intact distal pulses. Exam reveals no gallop and no friction rub.   No murmur heard.  Pulmonary/Chest: Normal expansion, symmetric chest wall expansion and effort normal. No stridor. No respiratory distress. He has decreased breath sounds. He has no wheezes. He has no rhonchi. He has no rales. Chest wall is not dull to percussion. He exhibits no tenderness. Negative for egophony. Negative for tactile fremitus.   Abdominal: Soft. Bowel sounds are normal. He exhibits no distension and no mass. There is no hepatosplenomegaly. There is no abdominal tenderness. There is no rebound and no guarding. No hernia.   Musculoskeletal:          General: No tenderness, deformity or edema. Normal range of motion.      Cervical back: Normal range of motion and neck supple.   Lymphadenopathy: No supraclavicular adenopathy is present.     He has no cervical adenopathy.     He has no axillary adenopathy.   Neurological: He is alert and oriented to person, place, and time. He has normal reflexes. He displays normal reflexes. No cranial nerve deficit. He exhibits normal muscle tone.   Skin: Skin is warm and dry. No rash noted. He is not diaphoretic. No cyanosis or erythema. No pallor. Nails show no clubbing.   Psychiatric: He has a normal mood and affect. His behavior is normal. Judgment and thought content normal.         Accessory Clinical Data:  Chest x-ray:  Well expanded right middle and lower lobes    CT scan:     PFTs:  Repeat PFTs today with expected finding of moderate restriction and moderately reduced diffusion capacity.  This is expected to improve with time.    6MWT:     TTE:    Lab data:    All radiographic imaging of the chest, PFT tracings/data, and 6MWT data have been independently reviewed and interpreted unless otherwise specified.     Assessment and Plan:        Problem List Items Addressed This Visit    None  Visit Diagnoses       Diffusion capacity of lung (dl), decreased    -  Primary    Restrictive pattern present on pulmonary function testing        Chronic hypoxemic respiratory failure        Nocturnal hypoxemia        History of tobacco use        Non-small cell cancer of right lung        Insomnia, unspecified type               Patient has multiple medical problems including type 2 diabetes mellitus, hypertension, hyperlipidemia, coronary artery disease status post PCI stenting in 2008 on anti platelet agents, history of extensive tobacco use, obesity and postoperative atrial fibrillation.  Patient recently underwent right upper lobectomy with mediastinal lymphadenopathy does section with negative nodes on 09/21 by Dr. Isaac for right  upper lobe nodule which was biopsy-proven moderately differentiated squamous cell cancer.  This was complicated by hemothorax requiring VATS and evacuation of hematoma with atelectasis requiring bronchoscopy and brief mechanical ventilation.  Patient has recovered well since then.  He still has supplemental oxygen with him that he uses as needed during ambulation.  He is still using nocturnal oxygen.  We will send for nocturnal pulse oximetry on room air to determine need for further supplemental oxygen.  If there is cyclic pattern of the oxygenation, he may require formal sleep study.  PFTs with expected restriction post hemothorax and lobectomy.  Current FEV1 is 66 percent predicted with FVC of 59 percent predicted and a normal ratio.  TLC is 62 percent predicted with a diffusion capacity of 46 percent predicted.  Values in preoperative setting were FEV1 of 92 percent, FVC of 82 percent, ratio of 84, TLC of 86 percent and diffusion capacity of 70 percent predicted.  There is literally no role of bronchodilators currently.  Recommend to continue the you to use incentive spirometry 2.  Patient is recovering well with no acute complaints at this point.  He will contact his primary care for his sleep onset and maintenance insomnia.  I have advised him to omit daytime naps to help sleep better at nighttime.  He can take over-the-counter melatonin but advised to take his at least 2 hours prior to his intended sleep time and avoid driving, operating heavy machinery etcetera after taking melatonin.  Underlying anxiety and postsurgical trauma need to be better treated.  This will be deferred to his primary care provider.       Follow up in about 6 months (around 5/3/2023).     This note is dictated on M*Modal word recognition program.  There are word recognition mistakes that are occasionally missed on review.

## 2022-11-14 ENCOUNTER — OFFICE VISIT (OUTPATIENT)
Dept: HEMATOLOGY/ONCOLOGY | Facility: CLINIC | Age: 72
End: 2022-11-14
Payer: OTHER GOVERNMENT

## 2022-11-14 VITALS
WEIGHT: 255 LBS | HEIGHT: 72 IN | DIASTOLIC BLOOD PRESSURE: 85 MMHG | SYSTOLIC BLOOD PRESSURE: 165 MMHG | OXYGEN SATURATION: 97 % | HEART RATE: 55 BPM | RESPIRATION RATE: 18 BRPM | BODY MASS INDEX: 34.54 KG/M2

## 2022-11-14 DIAGNOSIS — C80.1 VERRUCOUS CARCINOMA: ICD-10-CM

## 2022-11-14 DIAGNOSIS — C34.91 NSCLC OF RIGHT LUNG: Primary | ICD-10-CM

## 2022-11-14 PROCEDURE — 99215 PR OFFICE/OUTPT VISIT, EST, LEVL V, 40-54 MIN: ICD-10-PCS | Mod: S$GLB,,, | Performed by: NURSE PRACTITIONER

## 2022-11-14 PROCEDURE — 99215 OFFICE O/P EST HI 40 MIN: CPT | Mod: S$GLB,,, | Performed by: NURSE PRACTITIONER

## 2022-11-14 RX ORDER — BUSPIRONE HYDROCHLORIDE 10 MG/1
1 TABLET ORAL 2 TIMES DAILY PRN
COMMUNITY
Start: 2022-11-08 | End: 2023-06-14

## 2022-11-14 RX ORDER — ENALAPRIL MALEATE 20 MG/1
40 TABLET ORAL
COMMUNITY
Start: 2022-03-21

## 2022-11-14 RX ORDER — DILTIAZEM HYDROCHLORIDE 240 MG/1
CAPSULE, COATED, EXTENDED RELEASE ORAL
COMMUNITY
Start: 2022-10-13 | End: 2023-06-14

## 2022-11-14 RX ORDER — AMLODIPINE BESYLATE 10 MG/1
TABLET ORAL
COMMUNITY
Start: 2022-11-08

## 2022-11-14 RX ORDER — ALOGLIPTIN 25 MG/1
1 TABLET, FILM COATED ORAL DAILY
COMMUNITY
Start: 2022-03-21

## 2022-11-14 RX ORDER — FUROSEMIDE 40 MG/1
TABLET ORAL
COMMUNITY
Start: 2022-10-13 | End: 2023-06-14

## 2022-11-14 NOTE — PROGRESS NOTES
MEDICAL ONCOLOGY FOLLOW UP CONSULTATION NOTE    Patient ID: Marcia Gregory is a 72 y.o. male.    Chief Complaint: Lung Cancer    HPI:  Patient is a 71-year-old male active current smoker with past medical history of DVT, diabetes mellitus, hyperlipidemia, hypertension, CAD status post myocardial infarction with past surgical history of knee replacement and tonsillectomy was referred by Pulmonary after recent right upper lobe lung nodule core biopsy was consistent with moderately differentiated squamous cell carcinoma please see detailed pathology report below.  Patient reports having a PET scan done on 2022 which showed findings as below.  Patient presents to our clinic today 22 for further evaluation.      Imagin22    Chest:  There is a 12 mm nodule in the right upper lung field showing an increased radiotracer uptake with an SUV of 12.6    Abdomen:  Normal  Pelvis:  Normal  Osseous structures:  Normal    Impression:  Hypermetabolic nodule in the right upper lung field consistent with malignancy    Pathology:     22: pT1c pN0 M0 G2        22  RIGHT UPPER LOBE OF LUNG, NODULE, CORE BIOPSY:      - MODERATELY DIFFERENTIATED SQUAMOUS CELL CARCINOMA (NON-SMALL   CARCINOMA).   Comment:  Immunohistochemical staining is performed upon sections from block   1A.  The tumor cells demonstrate positivity for CK5/6, p40, and p63.   Staining is negative for TTF-1 and CK7.  All associated controls stain as   expected.         Past Medical History:   Diagnosis Date    Blood clot in vein     Diabetes mellitus, type 2     Hyperlipidemia     Hypertension     Myocardial infarction     NSCLC of right lung 2022    Skin cancer      Family History   Problem Relation Age of Onset    Heart failure Father     Cancer Sister     Breast cancer Sister     Diabetes Brother      Social History     Socioeconomic History    Marital status:    Tobacco Use    Smoking status: Former      Packs/day: 1.00     Years: 40.00     Pack years: 40.00     Types: Cigarettes     Quit date: 2022     Years since quittin.4    Smokeless tobacco: Never    Tobacco comments:     Patient states he is cutting down 6-7 cigarettes a day   Substance and Sexual Activity    Alcohol use: Not Currently     Comment: maybe once a year    Drug use: Never     Past Surgical History:   Procedure Laterality Date    cardiac stents      KNEE SURGERY      TONSILLECTOMY           Review of systems:  Review of Systems   Constitutional:  Negative for activity change, appetite change, chills, diaphoresis, fatigue and unexpected weight change.   HENT:  Negative for congestion, facial swelling, hearing loss, mouth sores, trouble swallowing and voice change.    Eyes:  Negative for photophobia, pain, discharge and itching.   Respiratory:  Negative for apnea, cough, choking, chest tightness and shortness of breath.    Cardiovascular:  Negative for chest pain, palpitations and leg swelling.   Gastrointestinal:  Negative for abdominal distention, abdominal pain, anal bleeding and blood in stool.   Endocrine: Negative for cold intolerance, heat intolerance, polydipsia and polyphagia.   Genitourinary:  Negative for difficulty urinating, dysuria, flank pain and hematuria.   Musculoskeletal:  Negative for arthralgias, back pain, joint swelling, myalgias, neck pain and neck stiffness.   Skin:  Negative for color change, pallor and wound.   Allergic/Immunologic: Negative for environmental allergies, food allergies and immunocompromised state.   Neurological:  Negative for dizziness, seizures, facial asymmetry, speech difficulty, light-headedness, numbness and headaches.   Hematological:  Negative for adenopathy. Does not bruise/bleed easily.   Psychiatric/Behavioral:  Negative for agitation, behavioral problems, confusion, decreased concentration and sleep disturbance.           Physical Exam  Vitals and nursing note reviewed.   Constitutional:        General: He is not in acute distress.     Appearance: Normal appearance. He is not ill-appearing.   HENT:      Head: Normocephalic and atraumatic.      Nose: No congestion or rhinorrhea.   Eyes:      General: No scleral icterus.     Extraocular Movements: Extraocular movements intact.      Pupils: Pupils are equal, round, and reactive to light.   Cardiovascular:      Rate and Rhythm: Normal rate and regular rhythm.      Pulses: Normal pulses.      Heart sounds: Normal heart sounds. No murmur heard.    No gallop.   Pulmonary:      Effort: Pulmonary effort is normal. No respiratory distress.      Breath sounds: Normal breath sounds. No stridor. No wheezing or rhonchi.   Abdominal:      General: Bowel sounds are normal. There is no distension.      Palpations: There is no mass.      Tenderness: There is no abdominal tenderness. There is no guarding.   Musculoskeletal:         General: No swelling, tenderness, deformity or signs of injury. Normal range of motion.      Cervical back: Normal range of motion and neck supple. No rigidity. No muscular tenderness.      Right lower leg: No edema.      Left lower leg: No edema.   Skin:     General: Skin is warm.      Coloration: Skin is not jaundiced or pale.      Findings: No bruising or lesion.   Neurological:      General: No focal deficit present.      Mental Status: He is alert and oriented to person, place, and time.      Cranial Nerves: No cranial nerve deficit.      Sensory: No sensory deficit.      Motor: No weakness.      Gait: Gait normal.   Psychiatric:         Mood and Affect: Mood normal.         Behavior: Behavior normal.         Thought Content: Thought content normal.            Vitals:    11/14/22 0809   BP: (!) 165/85   Pulse: (!) 55   Resp: 18   Body surface area is 2.42 meters squared.    Assessment/Plan:      ECOG 1    1. Moderately differentiated squamous cell carcinoma arising from the right upper lobe of lung:    == cT1b, cN0, M0, Stage IA2  == PET  scan done 05/16/2022 more than 2 months back showed clinical findings as above.  == Core biopsy done 07/20/2022 completed few days back showed findings as above.  I discussed with him further management options for early stage lung cancer.  PFTs showed mildly reduced at DLCO of 70% predicted likely due to active smoking otherwise- normal profile.  I will make referral to cardiothoracic surgery for further evaluation.  Ideally I would like a PET scan to be repeated since it has been more than 2 months since his prior PET scan, but even if this is not done I would like this patient to be evaluated by Cardiothoracic surgery for consideration of pathologic mediastinal lymph node evaluation and if an operable candidate to undergo surgical exploration and resection.  Based on surgical resection we will determine if there is a need for any adjuvant treatment.  For clinical stage I A 2 with negative margins only observation is indicated.   ==8/31/22: Review of PET/CT shows very minimal disease growth. Lung lesion now measuring 14 mm (12 mm prior).  He has seen cardiothoracic surgery at this time and will be scheduled for surgical resection in the coming weeks.  Will see him post surgery   ==11/14/22: s/p right upper lobectomy with mediastinal lymphadenopathy does section with negative nodes on 09/21/22 by Dr. Isaac for right upper lobe nodule which was biopsy-proven moderately differentiated squamous cell cancer. (pT1c pN0 M0).  This was complicated by hemothorax requiring VATS and evacuation of hematoma with atelectasis requiring bronchoscopy and brief mechanical ventilation.Patient has recovered well since then.  He still has supplemental oxygen with him that he uses as needed during ambulation.     2. Right intranasal lesion: WELL DIFFERENTIATED VERRUCOID SCC CAN'T CONFIRM INVASION:  == Evaluated at [facility] for nasal polyp at the end of July, s/p recision on 8/2/22.  == Continue to follow-up at [facility]        Future  Appointments   Date Time Provider Department Center   5/3/2023  9:00 AM David Nunes MD [facility] PULM  [address] [facility]       RT in 4 weeks for MD visit  with CT Chest cbc cmp      Total time spent in counseling and discussion about further management options including relevant lab work, treatment,  prognosis, medications and intended side effects was more than 60 minutes. More than 50% of the time was spent on counseling and coordination of care.  I spent a total of 60 minutes on the day of the visit.This includes face to face time and non-face to face time preparing to see the patient (eg, review of tests), Obtaining and/or reviewing separately obtained history, Documenting clinical information in the electronic or other health record, Independently interpreting resultsand communicating results to the patient/family/caregiver, or Care coordination.

## 2022-11-15 ENCOUNTER — TELEPHONE (OUTPATIENT)
Dept: CARDIOTHORACIC SURGERY | Facility: CLINIC | Age: 72
End: 2022-11-15
Payer: MEDICARE

## 2022-11-15 NOTE — TELEPHONE ENCOUNTER
Spoke with his wife in office this afternoon regarding his message, he ran out of BP medication while [facility] to [facility], he went to the ER and [facility] Newman.     ----- Message from True Briscoe sent at 11/15/2022  3:53 PM CST -----  Contact: pt  .Type:  Patient Returning Call    Who Called:kenton   Who Left Message for Patient:  Does the patient know what this is regarding?:miss call  Would the patient rather a call back or a response via [facility]?   Best Call Back Number:.[phone]    Additional Information:

## 2022-11-16 ENCOUNTER — CLINICAL SUPPORT (OUTPATIENT)
Dept: SMOKING CESSATION | Facility: CLINIC | Age: 72
End: 2022-11-16
Payer: COMMERCIAL

## 2022-11-16 ENCOUNTER — TELEPHONE (OUTPATIENT)
Dept: PULMONOLOGY | Facility: CLINIC | Age: 72
End: 2022-11-16
Payer: MEDICARE

## 2022-11-16 DIAGNOSIS — F17.200 NICOTINE DEPENDENCE: Primary | ICD-10-CM

## 2022-11-16 PROCEDURE — 99407 BEHAV CHNG SMOKING > 10 MIN: CPT | Mod: S$GLB,,,

## 2022-11-16 PROCEDURE — 99407 PR TOBACCO USE CESSATION INTENSIVE >10 MINUTES: ICD-10-PCS | Mod: S$GLB,,,

## 2022-11-16 NOTE — TELEPHONE ENCOUNTER
Returned call and spoke with patient. He has an appointment to see if he still [facility] and they will handle ordering a portable if needed. LB  ----- Message from Adenike Hamilton sent at 11/15/2022  4:54 PM CST -----  Regarding: Return Call  Contact: Patient  Type:  Patient Returning Call    Who Called:Marcia  Who Left Message for Patient: Emilee   Does the patient know what this is regarding?:no   Would the patient rather a call back or a response via [facility]?  Call back   Best Call Back Number: [phone] (home)     Additional Information:     Thanks,  NADEEM

## 2022-11-16 NOTE — PROGRESS NOTES
Called pt to f/u on his 3 month smoking cessation quit status. Pt stated he remains tobacco free. Congratulated him on his hard work and success. Informed him of benefit period, phone follow ups, and contact information. Will complete smart form and will continue to follow up on quit #1 episode.

## 2022-12-06 ENCOUNTER — TELEPHONE (OUTPATIENT)
Dept: HEMATOLOGY/ONCOLOGY | Facility: CLINIC | Age: 72
End: 2022-12-06
Payer: MEDICARE

## 2022-12-14 ENCOUNTER — OFFICE VISIT (OUTPATIENT)
Dept: HEMATOLOGY/ONCOLOGY | Facility: CLINIC | Age: 72
End: 2022-12-14
Payer: OTHER GOVERNMENT

## 2022-12-14 VITALS
BODY MASS INDEX: 34.22 KG/M2 | RESPIRATION RATE: 16 BRPM | HEIGHT: 72 IN | OXYGEN SATURATION: 96 % | DIASTOLIC BLOOD PRESSURE: 70 MMHG | HEART RATE: 57 BPM | SYSTOLIC BLOOD PRESSURE: 129 MMHG | WEIGHT: 252.63 LBS

## 2022-12-14 DIAGNOSIS — C34.91 NSCLC OF RIGHT LUNG: Primary | ICD-10-CM

## 2022-12-14 LAB
ALBUMIN SERPL BCP-MCNC: 3.5 G/DL (ref 3.4–5)
ALBUMIN/GLOBULIN RATIO: 0.76 RATIO (ref 1.1–1.8)
ALP SERPL-CCNC: 113 U/L (ref 46–116)
ALT SERPL W P-5'-P-CCNC: 27 U/L (ref 12–78)
ANION GAP SERPL CALC-SCNC: 10 MMOL/L (ref 3–11)
AST SERPL-CCNC: 20 U/L (ref 15–37)
BASOPHILS NFR BLD: 0.4 % (ref 0–3)
BILIRUB SERPL-MCNC: 0.3 MG/DL (ref 0–1)
BUN SERPL-MCNC: 15 MG/DL (ref 7–18)
BUN/CREAT SERPL: 14.15 RATIO (ref 7–18)
CALCIUM SERPL-MCNC: 9 MG/DL (ref 8.8–10.5)
CHLORIDE SERPL-SCNC: 98 MMOL/L (ref 100–108)
CO2 SERPL-SCNC: 27 MMOL/L (ref 21–32)
CREAT SERPL-MCNC: 1.06 MG/DL (ref 0.7–1.3)
EOSINOPHIL NFR BLD: 1.5 % (ref 1–3)
ERYTHROCYTE [DISTWIDTH] IN BLOOD BY AUTOMATED COUNT: 14.6 % (ref 12.5–18)
GFR ESTIMATION: > 60
GLOBULIN: 4.6 G/DL (ref 2.3–3.5)
GLUCOSE SERPL-MCNC: 117 MG/DL (ref 70–110)
HCT VFR BLD AUTO: 43.8 % (ref 42–52)
HGB BLD-MCNC: 13.7 G/DL (ref 14–18)
LYMPHOCYTES NFR BLD: 30.3 % (ref 25–40)
MCH RBC QN AUTO: 24.3 PG (ref 27–31.2)
MCHC RBC AUTO-ENTMCNC: 31.3 G/DL (ref 31.8–35.4)
MCV RBC AUTO: 77.7 FL (ref 80–97)
MICROCYTES BLD QL SMEAR: NORMAL
MONOCYTES NFR BLD: 5.7 % (ref 1–15)
NEUTROPHILS # BLD AUTO: 4.16 10*3/UL (ref 1.8–7.7)
NEUTROPHILS NFR BLD: 62 % (ref 37–80)
NUCLEATED RED BLOOD CELLS: 0 %
PLATELETS: 182 10*3/UL (ref 142–424)
POTASSIUM SERPL-SCNC: 3.8 MMOL/L (ref 3.6–5.2)
PROT SERPL-MCNC: 8.1 G/DL (ref 6.4–8.2)
RBC # BLD AUTO: 5.64 10*6/UL (ref 4.7–6.1)
SODIUM BLD-SCNC: 135 MMOL/L (ref 135–145)
WBC # BLD: 6.7 10*3/UL (ref 4.6–10.2)

## 2022-12-14 PROCEDURE — 99214 PR OFFICE/OUTPT VISIT, EST, LEVL IV, 30-39 MIN: ICD-10-PCS | Mod: S$GLB,,, | Performed by: INTERNAL MEDICINE

## 2022-12-14 PROCEDURE — 99214 OFFICE O/P EST MOD 30 MIN: CPT | Mod: S$GLB,,, | Performed by: INTERNAL MEDICINE

## 2022-12-14 NOTE — PROGRESS NOTES
MEDICAL ONCOLOGY FOLLOW UP CONSULTATION NOTE    Patient ID: Marcia Gregory is a 72 y.o. male.    Chief Complaint: Lung Cancer    HPI:  Patient is a 71-year-old male active current smoker with past medical history of DVT, diabetes mellitus, hyperlipidemia, hypertension, CAD status post myocardial infarction with past surgical history of knee replacement and tonsillectomy was referred by Pulmonary after recent right upper lobe lung nodule core biopsy was consistent with moderately differentiated squamous cell carcinoma please see detailed pathology report below.  Patient reports having a PET scan done on 2022 which showed findings as below.  Patient presents to our clinic today 22 for further evaluation.      Imagin22    Chest:  There is a 12 mm nodule in the right upper lung field showing an increased radiotracer uptake with an SUV of 12.6    Abdomen:  Normal  Pelvis:  Normal  Osseous structures:  Normal    Impression:  Hypermetabolic nodule in the right upper lung field consistent with malignancy    Pathology:     22: pT1c pN0 M0 G2        22  RIGHT UPPER LOBE OF LUNG, NODULE, CORE BIOPSY:      - MODERATELY DIFFERENTIATED SQUAMOUS CELL CARCINOMA (NON-SMALL   CARCINOMA).   Comment:  Immunohistochemical staining is performed upon sections from block   1A.  The tumor cells demonstrate positivity for CK5/6, p40, and p63.   Staining is negative for TTF-1 and CK7.  All associated controls stain as   expected.         Past Medical History:   Diagnosis Date    Blood clot in vein     Diabetes mellitus, type 2     Hyperlipidemia     Hypertension     Myocardial infarction     NSCLC of right lung 2022    Skin cancer      Family History   Problem Relation Age of Onset    Heart failure Father     Cancer Sister     Breast cancer Sister     Diabetes Brother      Social History     Socioeconomic History    Marital status:    Tobacco Use    Smoking status: Former      Packs/day: 1.00     Years: 40.00     Pack years: 40.00     Types: Cigarettes     Quit date: 2022     Years since quittin.5    Smokeless tobacco: Never    Tobacco comments:     Patient states he is cutting down 6-7 cigarettes a day   Substance and Sexual Activity    Alcohol use: Not Currently     Comment: maybe once a year    Drug use: Never     Past Surgical History:   Procedure Laterality Date    cardiac stents      KNEE SURGERY      TONSILLECTOMY           Review of systems:  Review of Systems   Constitutional:  Negative for activity change, appetite change, chills, diaphoresis, fatigue and unexpected weight change.   HENT:  Negative for congestion, facial swelling, hearing loss, mouth sores, trouble swallowing and voice change.    Eyes:  Negative for photophobia, pain, discharge and itching.   Respiratory:  Negative for apnea, cough, choking, chest tightness and shortness of breath.    Cardiovascular:  Negative for chest pain, palpitations and leg swelling.   Gastrointestinal:  Negative for abdominal distention, abdominal pain, anal bleeding and blood in stool.   Endocrine: Negative for cold intolerance, heat intolerance, polydipsia and polyphagia.   Genitourinary:  Negative for difficulty urinating, dysuria, flank pain and hematuria.   Musculoskeletal:  Negative for arthralgias, back pain, joint swelling, myalgias, neck pain and neck stiffness.   Skin:  Negative for color change, pallor and wound.   Allergic/Immunologic: Negative for environmental allergies, food allergies and immunocompromised state.   Neurological:  Negative for dizziness, seizures, facial asymmetry, speech difficulty, light-headedness, numbness and headaches.   Hematological:  Negative for adenopathy. Does not bruise/bleed easily.   Psychiatric/Behavioral:  Negative for agitation, behavioral problems, confusion, decreased concentration and sleep disturbance.           Physical Exam  Vitals and nursing note reviewed.   Constitutional:        General: He is not in acute distress.     Appearance: Normal appearance. He is not ill-appearing.   HENT:      Head: Normocephalic and atraumatic.      Nose: No congestion or rhinorrhea.   Eyes:      General: No scleral icterus.     Extraocular Movements: Extraocular movements intact.      Pupils: Pupils are equal, round, and reactive to light.   Cardiovascular:      Rate and Rhythm: Normal rate and regular rhythm.      Pulses: Normal pulses.      Heart sounds: Normal heart sounds. No murmur heard.    No gallop.   Pulmonary:      Effort: Pulmonary effort is normal. No respiratory distress.      Breath sounds: Normal breath sounds. No stridor. No wheezing or rhonchi.   Abdominal:      General: Bowel sounds are normal. There is no distension.      Palpations: There is no mass.      Tenderness: There is no abdominal tenderness. There is no guarding.   Musculoskeletal:         General: No swelling, tenderness, deformity or signs of injury. Normal range of motion.      Cervical back: Normal range of motion and neck supple. No rigidity. No muscular tenderness.      Right lower leg: No edema.      Left lower leg: No edema.   Skin:     General: Skin is warm.      Coloration: Skin is not jaundiced or pale.      Findings: No bruising or lesion.   Neurological:      General: No focal deficit present.      Mental Status: He is alert and oriented to person, place, and time.      Cranial Nerves: No cranial nerve deficit.      Sensory: No sensory deficit.      Motor: No weakness.      Gait: Gait normal.   Psychiatric:         Mood and Affect: Mood normal.         Behavior: Behavior normal.         Thought Content: Thought content normal.            Vitals:    12/14/22 0924   BP: 129/70   Pulse: (!) 57   Resp: 16   Body surface area is 2.41 meters squared.    Assessment/Plan:      ECOG 1    1. Moderately differentiated squamous cell carcinoma arising from the right upper lobe of lung:    == cT1b, cN0, M0, Stage IA2  == PET  scan done 05/16/2022 more than 2 months back showed clinical findings as above.  == Core biopsy done 07/20/2022 completed few days back showed findings as above.  I discussed with him further management options for early stage lung cancer.  PFTs showed mildly reduced at DLCO of 70% predicted likely due to active smoking otherwise- normal profile.  I will make referral to cardiothoracic surgery for further evaluation.  Ideally I would like a PET scan to be repeated since it has been more than 2 months since his prior PET scan, but even if this is not done I would like this patient to be evaluated by Cardiothoracic surgery for consideration of pathologic mediastinal lymph node evaluation and if an operable candidate to undergo surgical exploration and resection.  Based on surgical resection we will determine if there is a need for any adjuvant treatment.  For clinical stage I A 2 with negative margins only observation is indicated.   ==8/31/22: Review of PET/CT shows very minimal disease growth. Lung lesion now measuring 14 mm (12 mm prior).  He has seen cardiothoracic surgery at this time and will be scheduled for surgical resection in the coming weeks.  Will see him post surgery   ==11/14/22: s/p right upper lobectomy with mediastinal lymphadenopathy does section with negative nodes on 09/21/22 by Dr. Isaac for right upper lobe nodule which was biopsy-proven moderately differentiated squamous cell cancer. (pT1c pN0 M0).  This was complicated by hemothorax requiring VATS and evacuation of hematoma with atelectasis requiring bronchoscopy and brief mechanical ventilation.Patient has recovered well since then.  He still has supplemental oxygen with him that he uses as needed during ambulation.   == 12/14/22:  No evidence of recurrence on recent CT scan chest.  Also noted to have marked improvement of the pleural and parenchymal region on the right side of the chest. Continue observation    2. Right intranasal lesion:  WELL DIFFERENTIATED VERRUCOID SCC CAN'T CONFIRM INVASION:  == Evaluated at [facility] for nasal polyp at the end of July, s/p recision on 8/2/22.  == Continue to follow-up at [facility]        RT in 2 months for MD visit  with CT Chest with IV contrast prior    Total time spent in counseling and discussion about further management options including relevant lab work, treatment,  prognosis, medications and intended side effects was more than 25 minutes. More than 50% of the time was spent on counseling and coordination of care.  I spent a total of 25 minutes on the day of the visit.This includes face to face time and non-face to face time preparing to see the patient (eg, review of tests), Obtaining and/or reviewing separately obtained history, Documenting clinical information in the electronic or other health record, Independently interpreting resultsand communicating results to the patient/family/caregiver, or Care coordination.

## 2023-02-08 ENCOUNTER — CLINICAL SUPPORT (OUTPATIENT)
Dept: SMOKING CESSATION | Facility: CLINIC | Age: 73
End: 2023-02-08
Payer: COMMERCIAL

## 2023-02-08 DIAGNOSIS — F17.200 NICOTINE DEPENDENCE: Primary | ICD-10-CM

## 2023-02-08 PROCEDURE — 99407 PR TOBACCO USE CESSATION INTENSIVE >10 MINUTES: ICD-10-PCS | Mod: S$GLB,,,

## 2023-02-08 PROCEDURE — 99407 BEHAV CHNG SMOKING > 10 MIN: CPT | Mod: S$GLB,,,

## 2023-02-08 NOTE — PROGRESS NOTES
Spoke with patient today in regard to smoking cessation progress for 6-month telephone follow up. Patient states that he is tobacco free.  Commended patient on their quit.  Informed patient of benefit period, future follow up, and contact information if any further help or support is needed. Will complete smart form for 6 month follow up on Quit attempt #1.

## 2023-03-06 ENCOUNTER — PATIENT MESSAGE (OUTPATIENT)
Dept: HEMATOLOGY/ONCOLOGY | Facility: CLINIC | Age: 73
End: 2023-03-06
Payer: MEDICARE

## 2023-03-07 DIAGNOSIS — C34.90 MALIGNANT NEOPLASM OF UNSPECIFIED PART OF UNSPECIFIED BRONCHUS OR LUNG: Primary | ICD-10-CM

## 2023-03-09 DIAGNOSIS — C34.90 MALIGNANT NEOPLASM OF UNSPECIFIED PART OF UNSPECIFIED BRONCHUS OR LUNG: Primary | ICD-10-CM

## 2023-03-10 LAB
BUN SERPL-MCNC: 20 MG/DL (ref 7–18)
CREAT SERPL-MCNC: 1.09 MG/DL (ref 0.7–1.3)
GFR ESTIMATION: > 60

## 2023-03-14 ENCOUNTER — OFFICE VISIT (OUTPATIENT)
Dept: HEMATOLOGY/ONCOLOGY | Facility: CLINIC | Age: 73
End: 2023-03-14
Payer: OTHER GOVERNMENT

## 2023-03-14 VITALS
SYSTOLIC BLOOD PRESSURE: 160 MMHG | HEART RATE: 72 BPM | TEMPERATURE: 98 F | RESPIRATION RATE: 16 BRPM | DIASTOLIC BLOOD PRESSURE: 82 MMHG | WEIGHT: 262 LBS | BODY MASS INDEX: 35.49 KG/M2 | OXYGEN SATURATION: 98 % | HEIGHT: 72 IN

## 2023-03-14 DIAGNOSIS — E66.01 SEVERE OBESITY (BMI 35.0-39.9) WITH COMORBIDITY: ICD-10-CM

## 2023-03-14 DIAGNOSIS — C34.90 MALIGNANT NEOPLASM OF UNSPECIFIED PART OF UNSPECIFIED BRONCHUS OR LUNG: Primary | ICD-10-CM

## 2023-03-14 DIAGNOSIS — Z79.84 DIABETES MELLITUS TREATED WITH ORAL MEDICATION: ICD-10-CM

## 2023-03-14 DIAGNOSIS — E11.9 DIABETES MELLITUS TREATED WITH ORAL MEDICATION: ICD-10-CM

## 2023-03-14 DIAGNOSIS — J44.9 CHRONIC OBSTRUCTIVE PULMONARY DISEASE, UNSPECIFIED COPD TYPE: ICD-10-CM

## 2023-03-14 DIAGNOSIS — I48.91 ATRIAL FIBRILLATION, UNSPECIFIED TYPE: ICD-10-CM

## 2023-03-14 LAB
ALBUMIN SERPL BCP-MCNC: 3.5 G/DL (ref 3.4–5)
ALBUMIN/GLOBULIN RATIO: 0.97 RATIO (ref 1.1–1.8)
ALP SERPL-CCNC: 87 U/L (ref 46–116)
ALT SERPL W P-5'-P-CCNC: 21 U/L (ref 12–78)
ANION GAP SERPL CALC-SCNC: 5 MMOL/L (ref 3–11)
AST SERPL-CCNC: 17 U/L (ref 15–37)
BASOPHILS NFR BLD: 0.9 % (ref 0–3)
BILIRUB SERPL-MCNC: 0.3 MG/DL (ref 0–1)
BUN SERPL-MCNC: 17 MG/DL (ref 7–18)
BUN/CREAT SERPL: 18.68 RATIO (ref 7–18)
CALCIUM SERPL-MCNC: 9.2 MG/DL (ref 8.8–10.5)
CHLORIDE SERPL-SCNC: 105 MMOL/L (ref 100–108)
CO2 SERPL-SCNC: 30 MMOL/L (ref 21–32)
CREAT SERPL-MCNC: 0.91 MG/DL (ref 0.7–1.3)
EOSINOPHIL NFR BLD: 1.3 % (ref 1–3)
ERYTHROCYTE [DISTWIDTH] IN BLOOD BY AUTOMATED COUNT: 16.6 % (ref 12.5–18)
GFR ESTIMATION: > 60
GLOBULIN: 3.6 G/DL (ref 2.3–3.5)
GLUCOSE SERPL-MCNC: 147 MG/DL (ref 70–110)
HCT VFR BLD AUTO: 41.1 % (ref 42–52)
HGB BLD-MCNC: 13.1 G/DL (ref 14–18)
LYMPHOCYTES NFR BLD: 31.8 % (ref 25–40)
MCH RBC QN AUTO: 25 PG (ref 27–31.2)
MCHC RBC AUTO-ENTMCNC: 31.9 G/DL (ref 31.8–35.4)
MCV RBC AUTO: 78.3 FL (ref 80–97)
MONOCYTES NFR BLD: 5.9 % (ref 1–15)
NEUTROPHILS # BLD AUTO: 4.14 10*3/UL (ref 1.8–7.7)
NEUTROPHILS NFR BLD: 59.7 % (ref 37–80)
NUCLEATED RED BLOOD CELLS: 0 %
PLATELETS: 213 10*3/UL (ref 142–424)
POTASSIUM SERPL-SCNC: 4.7 MMOL/L (ref 3.6–5.2)
PROT SERPL-MCNC: 7.1 G/DL (ref 6.4–8.2)
RBC # BLD AUTO: 5.25 10*6/UL (ref 4.7–6.1)
SODIUM BLD-SCNC: 140 MMOL/L (ref 135–145)
WBC # BLD: 6.9 10*3/UL (ref 4.6–10.2)

## 2023-03-14 PROCEDURE — 99214 PR OFFICE/OUTPT VISIT, EST, LEVL IV, 30-39 MIN: ICD-10-PCS | Mod: S$GLB,,, | Performed by: INTERNAL MEDICINE

## 2023-03-14 PROCEDURE — 99214 OFFICE O/P EST MOD 30 MIN: CPT | Mod: S$GLB,,, | Performed by: INTERNAL MEDICINE

## 2023-03-14 RX ORDER — HYDROCHLOROTHIAZIDE 25 MG/1
25 TABLET ORAL
COMMUNITY
Start: 2023-01-06

## 2023-03-14 NOTE — PROGRESS NOTES
MEDICAL ONCOLOGY FOLLOW UP CONSULTATION NOTE    Patient ID: Marcia Gregory is a 72 y.o. male.    Chief Complaint: Lung Cancer    HPI:  Patient is a 71-year-old male active current smoker with past medical history of DVT, diabetes mellitus, hyperlipidemia, hypertension, CAD status post myocardial infarction with past surgical history of knee replacement and tonsillectomy was referred by Pulmonary after recent right upper lobe lung nodule core biopsy was consistent with moderately differentiated squamous cell carcinoma please see detailed pathology report below.  Patient reports having a PET scan done on 2022 which showed findings as below.  Patient presents to our clinic today 22 for further evaluation.      Imagin22    Chest:  There is a 12 mm nodule in the right upper lung field showing an increased radiotracer uptake with an SUV of 12.6    Abdomen:  Normal  Pelvis:  Normal  Osseous structures:  Normal    Impression:  Hypermetabolic nodule in the right upper lung field consistent with malignancy    Pathology:     22: pT1c pN0 M0 G2        22  RIGHT UPPER LOBE OF LUNG, NODULE, CORE BIOPSY:      - MODERATELY DIFFERENTIATED SQUAMOUS CELL CARCINOMA (NON-SMALL   CARCINOMA).   Comment:  Immunohistochemical staining is performed upon sections from block   1A.  The tumor cells demonstrate positivity for CK5/6, p40, and p63.   Staining is negative for TTF-1 and CK7.  All associated controls stain as   expected.         Past Medical History:   Diagnosis Date    Blood clot in vein     Diabetes mellitus, type 2     Hyperlipidemia     Hypertension     Myocardial infarction     NSCLC of right lung 2022    Skin cancer      Family History   Problem Relation Age of Onset    Heart failure Father     Cancer Sister     Breast cancer Sister     Diabetes Brother      Social History     Socioeconomic History    Marital status:    Tobacco Use    Smoking status: Former      Packs/day: 1.00     Years: 51.00     Pack years: 51.00     Types: Cigarettes     Quit date: 2022     Years since quittin.6    Smokeless tobacco: Never    Tobacco comments:     Patient states he is cutting down 6-7 cigarettes a day   Substance and Sexual Activity    Alcohol use: Not Currently     Comment: maybe once a year    Drug use: Never     Past Surgical History:   Procedure Laterality Date    cardiac stents      KNEE SURGERY      TONSILLECTOMY           Review of systems:  Review of Systems   Constitutional:  Negative for activity change, appetite change, chills, diaphoresis, fatigue and unexpected weight change.   HENT:  Negative for congestion, facial swelling, hearing loss, mouth sores, trouble swallowing and voice change.    Eyes:  Negative for photophobia, pain, discharge and itching.   Respiratory:  Negative for apnea, cough, choking, chest tightness and shortness of breath.    Cardiovascular:  Negative for chest pain, palpitations and leg swelling.   Gastrointestinal:  Negative for abdominal distention, abdominal pain, anal bleeding and blood in stool.   Endocrine: Negative for cold intolerance, heat intolerance, polydipsia and polyphagia.   Genitourinary:  Negative for difficulty urinating, dysuria, flank pain and hematuria.   Musculoskeletal:  Negative for arthralgias, back pain, joint swelling, myalgias, neck pain and neck stiffness.   Skin:  Negative for color change, pallor and wound.   Allergic/Immunologic: Negative for environmental allergies, food allergies and immunocompromised state.   Neurological:  Negative for dizziness, seizures, facial asymmetry, speech difficulty, light-headedness, numbness and headaches.   Hematological:  Negative for adenopathy. Does not bruise/bleed easily.   Psychiatric/Behavioral:  Negative for agitation, behavioral problems, confusion, decreased concentration and sleep disturbance.           Physical Exam  Vitals and nursing note reviewed.   Constitutional:        General: He is not in acute distress.     Appearance: Normal appearance. He is not ill-appearing.   HENT:      Head: Normocephalic and atraumatic.      Nose: No congestion or rhinorrhea.   Eyes:      General: No scleral icterus.     Extraocular Movements: Extraocular movements intact.      Pupils: Pupils are equal, round, and reactive to light.   Cardiovascular:      Rate and Rhythm: Normal rate and regular rhythm.      Pulses: Normal pulses.      Heart sounds: Normal heart sounds. No murmur heard.    No gallop.   Pulmonary:      Effort: Pulmonary effort is normal. No respiratory distress.      Breath sounds: Normal breath sounds. No stridor. No wheezing or rhonchi.   Abdominal:      General: Bowel sounds are normal. There is no distension.      Palpations: There is no mass.      Tenderness: There is no abdominal tenderness. There is no guarding.   Musculoskeletal:         General: No swelling, tenderness, deformity or signs of injury. Normal range of motion.      Cervical back: Normal range of motion and neck supple. No rigidity. No muscular tenderness.      Right lower leg: No edema.      Left lower leg: No edema.   Skin:     General: Skin is warm.      Coloration: Skin is not jaundiced or pale.      Findings: No bruising or lesion.   Neurological:      General: No focal deficit present.      Mental Status: He is alert and oriented to person, place, and time.      Cranial Nerves: No cranial nerve deficit.      Sensory: No sensory deficit.      Motor: No weakness.      Gait: Gait normal.   Psychiatric:         Mood and Affect: Mood normal.         Behavior: Behavior normal.         Thought Content: Thought content normal.            Vitals:    03/14/23 0832   BP: (!) 160/82   Pulse: 72   Resp: 16   Temp: 97.5 °F (36.4 °C)   Body surface area is 2.46 meters squared.    Assessment/Plan:      ECOG 1    1. Moderately differentiated squamous cell carcinoma arising from the right upper lobe of lung:    == cT1b,  cN0, M0, Stage IA2.  PET scan done 05/16/2022 more than 2 months back showed clinical findings as above.  == Core biopsy done 07/20/2022 completed few days back showed findings as above.  I discussed with him further management options for early stage lung cancer.  PFTs showed mildly reduced at DLCO of 70% predicted likely due to active smoking otherwise- normal profile.  I will make referral to cardiothoracic surgery for further evaluation.  Ideally I would like a PET scan to be repeated since it has been more than 2 months since his prior PET scan, but even if this is not done I would like this patient to be evaluated by Cardiothoracic surgery for consideration of pathologic mediastinal lymph node evaluation and if an operable candidate to undergo surgical exploration and resection.  Based on surgical resection we will determine if there is a need for any adjuvant treatment.  For clinical stage I A 2 with negative margins only observation is indicated.   ==8/31/22: Review of PET/CT shows very minimal disease growth. Lung lesion now measuring 14 mm (12 mm prior).  He has seen cardiothoracic surgery at this time and will be scheduled for surgical resection in the coming weeks.  Will see him post surgery   ==11/14/22: s/p right upper lobectomy with mediastinal lymphadenopathy does section with negative nodes on 09/21/22 by Dr. Isaac for right upper lobe nodule which was biopsy-proven moderately differentiated squamous cell cancer. (pT1c pN0 M0).  This was complicated by hemothorax requiring VATS and evacuation of hematoma with atelectasis requiring bronchoscopy and brief mechanical ventilation.Patient has recovered well since then.  He still has supplemental oxygen with him that he uses as needed during ambulation.   == 3/14/22:  No evidence of recurrence on recent CT scan chest. Continue observation.    2. Right intranasal lesion: WELL DIFFERENTIATED VERRUCOID SCC CAN'T CONFIRM INVASION:  == Evaluated at [facility] for  nasal polyp at the end of July, s/p recision on 8/2/22.  == Continue to follow-up at [facility] in 3 months for MD visit  with CT Chest with IV contrast prior    Total time spent in counseling and discussion about further management options including relevant lab work, treatment,  prognosis, medications and intended side effects was more than 25 minutes. More than 50% of the time was spent on counseling and coordination of care.  I spent a total of 25 minutes on the day of the visit.This includes face to face time and non-face to face time preparing to see the patient (eg, review of tests), Obtaining and/or reviewing separately obtained history, Documenting clinical information in the electronic or other health record, Independently interpreting resultsand communicating results to the patient/family/caregiver, or Care coordination.

## 2023-05-03 ENCOUNTER — OFFICE VISIT (OUTPATIENT)
Dept: PULMONOLOGY | Facility: CLINIC | Age: 73
End: 2023-05-03
Payer: OTHER GOVERNMENT

## 2023-05-03 VITALS
SYSTOLIC BLOOD PRESSURE: 124 MMHG | HEART RATE: 62 BPM | DIASTOLIC BLOOD PRESSURE: 51 MMHG | HEIGHT: 72 IN | RESPIRATION RATE: 16 BRPM | WEIGHT: 280 LBS | BODY MASS INDEX: 37.93 KG/M2 | OXYGEN SATURATION: 95 %

## 2023-05-03 DIAGNOSIS — E66.9 OBESITY, UNSPECIFIED CLASSIFICATION, UNSPECIFIED OBESITY TYPE, UNSPECIFIED WHETHER SERIOUS COMORBIDITY PRESENT: ICD-10-CM

## 2023-05-03 DIAGNOSIS — R06.09 EXERTIONAL DYSPNEA: ICD-10-CM

## 2023-05-03 DIAGNOSIS — Z90.2 STATUS POST LOBECTOMY OF LUNG: ICD-10-CM

## 2023-05-03 DIAGNOSIS — J98.4 RESTRICTIVE LUNG DISEASE: Primary | ICD-10-CM

## 2023-05-03 PROCEDURE — 99213 OFFICE O/P EST LOW 20 MIN: CPT | Mod: S$GLB,,, | Performed by: INTERNAL MEDICINE

## 2023-05-03 PROCEDURE — 99213 PR OFFICE/OUTPT VISIT, EST, LEVL III, 20-29 MIN: ICD-10-PCS | Mod: S$GLB,,, | Performed by: INTERNAL MEDICINE

## 2023-05-03 RX ORDER — ALBUTEROL SULFATE 90 UG/1
2 AEROSOL, METERED RESPIRATORY (INHALATION) EVERY 6 HOURS PRN
Qty: 0.02 G | Refills: 5 | Status: SHIPPED | OUTPATIENT
Start: 2023-05-03 | End: 2023-10-12

## 2023-05-03 NOTE — PROGRESS NOTES
Subjective:    Patient Identification:   Patient ID: Marcia Gregory is a 72 y.o. male.    Referring Provider:  Established patient    Chief Complaint:  Diffusion capacity of lung (dl), decreased      History of Present Illness:    Marcia Gregory is a 72 y.o. male who presents for regular follow-up.    Currently off of oxygen and has been off for last 2-3 months.  His oxygen saturation remains between 95-98%.    Insomnia is much better.  Had more trouble after hospital discharge from trauma.    Still complain of some pain and numbness when he sneezes on the right side which is his surgical site.  Has mild exertional dyspnea which does not limit daily activities.  No other associated respiratory complaints.    Follows up with Dr. Baca for surveillance of lung cancer.    Review of Systems:  Review of Systems   Constitutional:  Negative for fever, chills, weight loss, weight gain, activity change, appetite change, fatigue, night sweats and weakness.   HENT:  Negative for nosebleeds, postnasal drip, rhinorrhea, sinus pressure, sore throat, trouble swallowing, voice change, congestion, ear pain and hearing loss.    Eyes:  Negative for redness and itching.   Respiratory:  Positive for dyspnea on extertion. Negative for cough, hemoptysis, sputum production, choking, chest tightness, shortness of breath, wheezing, orthopnea, previous hospitialization due to pulmonary problems, asthma nighttime symptoms, pleurisy, use of rescue inhaler and Paroxysmal Nocturnal Dyspnea.    Cardiovascular:  Negative for chest pain, palpitations and leg swelling.   Genitourinary:  Negative for difficulty urinating and hematuria.   Endocrine:  Negative for polydipsia, polyphagia, cold intolerance, heat intolerance and polyuria.    Musculoskeletal:  Negative for arthralgias, back pain, gait problem, joint swelling and myalgias.   Skin:  Negative for rash.   Gastrointestinal:  Negative for nausea, vomiting, abdominal pain, abdominal  distention and acid reflux.   Neurological:  Negative for dizziness, syncope, weakness, light-headedness and headaches.   Hematological:  Negative for adenopathy. Does not bruise/bleed easily and no excessive bruising.   Psychiatric/Behavioral:  Negative for confusion and sleep disturbance. The patient is not nervous/anxious.        Allergies: Review of patient's allergies indicates:  No Known Allergies    Medications:      Past Medical History:      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    Skin cancer        Family History:      Family History   Problem Relation Age of Onset    Heart failure Father     Cancer Sister     Breast cancer Sister     Diabetes Brother         Social History:      Past Surgical History:   Procedure Laterality Date    cardiac stents      KNEE SURGERY      TONSILLECTOMY         Physical Exam:  Vitals:    05/03/23 0831   BP: (!) 124/51   Pulse: 62   Resp: 16     Physical Exam   Constitutional: He is oriented to person, place, and time. He appears not cachectic. No distress. He is obese.   HENT:   Head: Normocephalic.   Right Ear: External ear normal.   Left Ear: External ear normal.   Nose: Nose normal. No mucosal edema. No polyps.   Mouth/Throat: Oropharynx is clear and moist. Normal dentition. No oropharyngeal exudate.   Neck: No JVD present. No tracheal deviation present. No thyromegaly present.   Cardiovascular: Normal rate, regular rhythm, normal heart sounds and intact distal pulses. Exam reveals no gallop and no friction rub.   No murmur heard.  Pulmonary/Chest: Normal expansion, symmetric chest wall expansion and effort normal. No stridor. No respiratory distress. He has decreased breath sounds. He has no wheezes. He has no rhonchi. He has no rales. Chest wall is not dull to percussion. He exhibits no tenderness. Negative for egophony.  Negative for tactile fremitus.   Abdominal: Soft. Bowel sounds are normal. He exhibits no distension and no mass. There is no hepatosplenomegaly. There is no abdominal tenderness. There is no rebound and no guarding. No hernia.   Musculoskeletal:         General: No tenderness, deformity or edema. Normal range of motion.      Cervical back: Normal range of motion and neck supple.   Lymphadenopathy: No supraclavicular adenopathy is present.     He has no cervical adenopathy.     He has no axillary adenopathy.   Neurological: He is alert and oriented to person, place, and time. He has normal reflexes. He displays normal reflexes. No cranial nerve deficit. He exhibits normal muscle tone.   Skin: Skin is warm and dry. No rash noted. He is not diaphoretic. No cyanosis or erythema. No pallor. Nails show no clubbing.   Psychiatric: He has a normal mood and affect. His behavior is normal. Judgment and thought content normal.         Accessory Clinical Data:  Chest x-ray:    CT scan:  CT chest from March was personally reviewed without any active disease, right lobectomy    PFTs:  Moderate restriction on PFTs from 11/03/2022 with moderately reduced diffusion capacity.      6MWT:     TTE:    Lab data:    All radiographic imaging of the chest, PFT tracings/data, and 6MWT data have been independently reviewed and interpreted unless otherwise specified.     Assessment and Plan:        Problem List Items Addressed This Visit    None  Visit Diagnoses       Restrictive lung disease    -  Primary    Status post lobectomy of lung        Obesity, unspecified classification, unspecified obesity type, unspecified whether serious comorbidity present        Exertional dyspnea        Relevant Medications    albuterol (VENTOLIN HFA) 90 mcg/actuation inhaler    Other Relevant Orders    Complete PFT with bronchodilator           Orders Placed This Encounter   Procedures    Complete PFT with bronchodilator     Standing Status:   Future      Standing Expiration Date:   5/3/2024     Order Specific Question:   Release to patient     Answer:   Immediate      Complains of very mild exertional dyspnea.  We will try albuterol as rescue inhaler.  Instructions on proper inhaler technique provided to the patient.    Continue to follow-up with Dr. Baca for lung cancer surveillance.  No evidence of active disease on recent CT chest reviewed.      Follow up in about 6 months (around 11/3/2023) for PFTs.     This note is dictated on M*Modal word recognition program.  There are word recognition mistakes that are occasionally missed on review.

## 2023-05-05 ENCOUNTER — TELEPHONE (OUTPATIENT)
Dept: PULMONOLOGY | Facility: CLINIC | Age: 73
End: 2023-05-05
Payer: MEDICARE

## 2023-05-05 NOTE — TELEPHONE ENCOUNTER
Spoke with Benitez with [employer] and he stated the Albuterol is covered by the plan and does not need a PA. Pt picked it up on May 3rd 2023 at [facility].

## 2023-05-23 ENCOUNTER — PATIENT MESSAGE (OUTPATIENT)
Dept: RESEARCH | Facility: HOSPITAL | Age: 73
End: 2023-05-23
Payer: MEDICARE

## 2023-05-24 ENCOUNTER — TELEPHONE (OUTPATIENT)
Dept: HEMATOLOGY/ONCOLOGY | Facility: CLINIC | Age: 73
End: 2023-05-24
Payer: MEDICARE

## 2023-06-14 ENCOUNTER — OFFICE VISIT (OUTPATIENT)
Dept: HEMATOLOGY/ONCOLOGY | Facility: CLINIC | Age: 73
End: 2023-06-14
Payer: OTHER GOVERNMENT

## 2023-06-14 VITALS
DIASTOLIC BLOOD PRESSURE: 67 MMHG | SYSTOLIC BLOOD PRESSURE: 123 MMHG | HEART RATE: 69 BPM | BODY MASS INDEX: 38.92 KG/M2 | OXYGEN SATURATION: 95 % | WEIGHT: 287 LBS

## 2023-06-14 DIAGNOSIS — C34.90 MALIGNANT NEOPLASM OF UNSPECIFIED PART OF UNSPECIFIED BRONCHUS OR LUNG: Primary | ICD-10-CM

## 2023-06-14 PROCEDURE — 99214 OFFICE O/P EST MOD 30 MIN: CPT | Mod: S$GLB,,, | Performed by: INTERNAL MEDICINE

## 2023-06-14 PROCEDURE — 99214 PR OFFICE/OUTPT VISIT, EST, LEVL IV, 30-39 MIN: ICD-10-PCS | Mod: S$GLB,,, | Performed by: INTERNAL MEDICINE

## 2023-06-14 NOTE — PROGRESS NOTES
MEDICAL ONCOLOGY FOLLOW UP CONSULTATION NOTE    Patient ID: Marcia Gregory is a 72 y.o. male.    Chief Complaint: Lung Cancer    HPI:  Patient is a 71-year-old male active current smoker with past medical history of DVT, diabetes mellitus, hyperlipidemia, hypertension, CAD status post myocardial infarction with past surgical history of knee replacement and tonsillectomy was referred by Pulmonary after recent right upper lobe lung nodule core biopsy was consistent with moderately differentiated squamous cell carcinoma please see detailed pathology report below.  Patient reports having a PET scan done on 2022 which showed findings as below.  Patient presents to our clinic today 22 for further evaluation.      Imagin22    Chest:  There is a 12 mm nodule in the right upper lung field showing an increased radiotracer uptake with an SUV of 12.6    Abdomen:  Normal  Pelvis:  Normal  Osseous structures:  Normal    Impression:  Hypermetabolic nodule in the right upper lung field consistent with malignancy    Pathology:     22: pT1c pN0 M0 G2        22  RIGHT UPPER LOBE OF LUNG, NODULE, CORE BIOPSY:      - MODERATELY DIFFERENTIATED SQUAMOUS CELL CARCINOMA (NON-SMALL   CARCINOMA).   Comment:  Immunohistochemical staining is performed upon sections from block   1A.  The tumor cells demonstrate positivity for CK5/6, p40, and p63.   Staining is negative for TTF-1 and CK7.  All associated controls stain as   expected.         Past Medical History:   Diagnosis Date    Blood clot in vein     Diabetes mellitus, type 2     Hyperlipidemia     Hypertension     Myocardial infarction     NSCLC of right lung 2022    Skin cancer      Family History   Problem Relation Age of Onset    Heart failure Father     Cancer Sister     Breast cancer Sister     Diabetes Brother      Social History     Socioeconomic History    Marital status:    Tobacco Use    Smoking status: Former      Packs/day: 1.00     Years: 51.00     Pack years: 51.00     Types: Cigarettes     Quit date: 2022     Years since quittin.9    Smokeless tobacco: Never    Tobacco comments:     Patient states he is cutting down 6-7 cigarettes a day   Substance and Sexual Activity    Alcohol use: Not Currently     Comment: maybe once a year    Drug use: Never     Past Surgical History:   Procedure Laterality Date    cardiac stents      KNEE SURGERY      TONSILLECTOMY           Review of systems:  Review of Systems   Constitutional:  Negative for activity change, appetite change, chills, diaphoresis, fatigue and unexpected weight change.   HENT:  Negative for congestion, facial swelling, hearing loss, mouth sores, trouble swallowing and voice change.    Eyes:  Negative for photophobia, pain, discharge and itching.   Respiratory:  Negative for apnea, cough, choking, chest tightness and shortness of breath.    Cardiovascular:  Negative for chest pain, palpitations and leg swelling.   Gastrointestinal:  Negative for abdominal distention, abdominal pain, anal bleeding and blood in stool.   Endocrine: Negative for cold intolerance, heat intolerance, polydipsia and polyphagia.   Genitourinary:  Negative for difficulty urinating, dysuria, flank pain and hematuria.   Musculoskeletal:  Negative for arthralgias, back pain, joint swelling, myalgias, neck pain and neck stiffness.   Skin:  Negative for color change, pallor and wound.   Allergic/Immunologic: Negative for environmental allergies, food allergies and immunocompromised state.   Neurological:  Negative for dizziness, seizures, facial asymmetry, speech difficulty, light-headedness, numbness and headaches.   Hematological:  Negative for adenopathy. Does not bruise/bleed easily.   Psychiatric/Behavioral:  Negative for agitation, behavioral problems, confusion, decreased concentration and sleep disturbance.           Physical Exam  Vitals and nursing note reviewed.   Constitutional:        General: He is not in acute distress.     Appearance: Normal appearance. He is not ill-appearing.   HENT:      Head: Normocephalic and atraumatic.      Nose: No congestion or rhinorrhea.   Eyes:      General: No scleral icterus.     Extraocular Movements: Extraocular movements intact.      Pupils: Pupils are equal, round, and reactive to light.   Cardiovascular:      Rate and Rhythm: Normal rate and regular rhythm.      Pulses: Normal pulses.      Heart sounds: Normal heart sounds. No murmur heard.    No gallop.   Pulmonary:      Effort: Pulmonary effort is normal. No respiratory distress.      Breath sounds: Normal breath sounds. No stridor. No wheezing or rhonchi.   Abdominal:      General: Bowel sounds are normal. There is no distension.      Palpations: There is no mass.      Tenderness: There is no abdominal tenderness. There is no guarding.   Musculoskeletal:         General: No swelling, tenderness, deformity or signs of injury. Normal range of motion.      Cervical back: Normal range of motion and neck supple. No rigidity. No muscular tenderness.      Right lower leg: No edema.      Left lower leg: No edema.   Skin:     General: Skin is warm.      Coloration: Skin is not jaundiced or pale.      Findings: No bruising or lesion.   Neurological:      General: No focal deficit present.      Mental Status: He is alert and oriented to person, place, and time.      Cranial Nerves: No cranial nerve deficit.      Sensory: No sensory deficit.      Motor: No weakness.      Gait: Gait normal.   Psychiatric:         Mood and Affect: Mood normal.         Behavior: Behavior normal.         Thought Content: Thought content normal.            Vitals:    06/14/23 0838   BP: 123/67   Pulse: 69   Body surface area is 2.57 meters squared.    Assessment/Plan:      ECOG 1    1. Moderately differentiated squamous cell carcinoma arising from the right upper lobe of lung:    == cT1b, cN0, M0, Stage IA2.  PET scan done  05/16/2022 more than 2 months back showed clinical findings as above.  == Core biopsy done 07/20/2022 completed few days back showed findings as above.  I discussed with him further management options for early stage lung cancer.  PFTs showed mildly reduced at DLCO of 70% predicted likely due to active smoking otherwise- normal profile.  I will make referral to cardiothoracic surgery for further evaluation.  Ideally I would like a PET scan to be repeated since it has been more than 2 months since his prior PET scan, but even if this is not done I would like this patient to be evaluated by Cardiothoracic surgery for consideration of pathologic mediastinal lymph node evaluation and if an operable candidate to undergo surgical exploration and resection.  Based on surgical resection we will determine if there is a need for any adjuvant treatment.  For clinical stage I A 2 with negative margins only observation is indicated.   ==8/31/22: Review of PET/CT shows very minimal disease growth. Lung lesion now measuring 14 mm (12 mm prior).  He has seen cardiothoracic surgery at this time and will be scheduled for surgical resection in the coming weeks.  Will see him post surgery   ==11/14/22: s/p right upper lobectomy with mediastinal lymphadenopathy does section with negative nodes on 09/21/22 by Dr. Isaac for right upper lobe nodule which was biopsy-proven moderately differentiated squamous cell cancer. (pT1c pN0 M0).  This was complicated by hemothorax requiring VATS and evacuation of hematoma with atelectasis requiring bronchoscopy and brief mechanical ventilation.Patient has recovered well since then.  He still has supplemental oxygen with him that he uses as needed during ambulation.   == 6/2/23:  No evidence of recurrence on recent CT scan chest. Continue observation.    2. Right intranasal lesion: WELL DIFFERENTIATED VERRUCOID SCC CAN'T CONFIRM INVASION:  == Evaluated at [facility] for nasal polyp at the end of July, s/p  recision on 8/2/22.  == Continue to follow-up at [facility] in 3 months for MD visit  with CT Chest with IV contrast prior    Total time spent in counseling and discussion about further management options including relevant lab work, treatment,  prognosis, medications and intended side effects was more than 25 minutes. More than 50% of the time was spent on counseling and coordination of care.  I spent a total of 25 minutes on the day of the visit.This includes face to face time and non-face to face time preparing to see the patient (eg, review of tests), Obtaining and/or reviewing separately obtained history, Documenting clinical information in the electronic or other health record, Independently interpreting resultsand communicating results to the patient/family/caregiver, or Care coordination.

## 2023-08-08 ENCOUNTER — CLINICAL SUPPORT (OUTPATIENT)
Dept: SMOKING CESSATION | Facility: CLINIC | Age: 73
End: 2023-08-08
Payer: COMMERCIAL

## 2023-08-08 DIAGNOSIS — F17.200 NICOTINE DEPENDENCE, UNCOMPLICATED: Primary | ICD-10-CM

## 2023-08-08 PROCEDURE — 99407 PR TOBACCO USE CESSATION INTENSIVE >10 MINUTES: ICD-10-PCS | Mod: S$GLB,,, | Performed by: GENERAL PRACTICE

## 2023-08-08 PROCEDURE — 99407 BEHAV CHNG SMOKING > 10 MIN: CPT | Mod: S$GLB,,, | Performed by: GENERAL PRACTICE

## 2023-08-08 NOTE — PROGRESS NOTES
Spoke with patient today in regard to smoking cessation progress for 12 month follow up. He states he is tobacco free. Congratulated patient on their success to remain tobacco free. Patient has not smoked a cigarette in about a year. Informed patient of benefit period, future follow ups and contact information if any further help is needed. Will resolve smart form for 12 month follow up for Quit # 1.

## 2023-09-12 ENCOUNTER — TELEPHONE (OUTPATIENT)
Dept: HEMATOLOGY/ONCOLOGY | Facility: CLINIC | Age: 73
End: 2023-09-12
Payer: MEDICARE

## 2023-09-12 NOTE — TELEPHONE ENCOUNTER
Patient called to have Ct orders faxed to order smart. I faxed and returned patients call to let know they can schedule. 8/12/2023 3:27 pm

## 2023-09-14 DIAGNOSIS — C34.11 MALIGNANT NEOPLASM OF UPPER LOBE OF RIGHT LUNG: Primary | ICD-10-CM

## 2023-09-18 ENCOUNTER — PATIENT MESSAGE (OUTPATIENT)
Dept: PULMONOLOGY | Facility: CLINIC | Age: 73
End: 2023-09-18
Payer: MEDICARE

## 2023-09-28 ENCOUNTER — DOCUMENT SCAN (OUTPATIENT)
Dept: HOME HEALTH SERVICES | Facility: HOSPITAL | Age: 73
End: 2023-09-28
Payer: MEDICARE

## 2023-10-06 DIAGNOSIS — N40.0 BPH (BENIGN PROSTATIC HYPERPLASIA): Primary | ICD-10-CM

## 2023-10-12 ENCOUNTER — OFFICE VISIT (OUTPATIENT)
Dept: UROLOGY | Facility: CLINIC | Age: 73
End: 2023-10-12
Payer: OTHER GOVERNMENT

## 2023-10-12 DIAGNOSIS — N40.1 BENIGN PROSTATIC HYPERPLASIA WITH LOWER URINARY TRACT SYMPTOMS, SYMPTOM DETAILS UNSPECIFIED: Primary | ICD-10-CM

## 2023-10-12 DIAGNOSIS — R35.1 NOCTURIA: ICD-10-CM

## 2023-10-12 LAB
BILIRUBIN, UA POC OHS: ABNORMAL
BLOOD, UA POC OHS: NEGATIVE
CLARITY, UA POC OHS: CLEAR
COLOR, UA POC OHS: YELLOW
GLUCOSE, UA POC OHS: NEGATIVE
KETONES, UA POC OHS: ABNORMAL
LEUKOCYTES, UA POC OHS: NEGATIVE
NITRITE, UA POC OHS: NEGATIVE
PH, UA POC OHS: 5.5
PROTEIN, UA POC OHS: 30
SPECIFIC GRAVITY, UA POC OHS: 1.02
UROBILINOGEN, UA POC OHS: 0.2

## 2023-10-12 PROCEDURE — 81003 URINALYSIS AUTO W/O SCOPE: CPT | Mod: QW,S$GLB,, | Performed by: NURSE PRACTITIONER

## 2023-10-12 PROCEDURE — 99204 PR OFFICE/OUTPT VISIT, NEW, LEVL IV, 45-59 MIN: ICD-10-PCS | Mod: S$GLB,,, | Performed by: NURSE PRACTITIONER

## 2023-10-12 PROCEDURE — 99204 OFFICE O/P NEW MOD 45 MIN: CPT | Mod: S$GLB,,, | Performed by: NURSE PRACTITIONER

## 2023-10-12 PROCEDURE — 81003 POCT URINALYSIS(INSTRUMENT): ICD-10-PCS | Mod: QW,S$GLB,, | Performed by: NURSE PRACTITIONER

## 2023-10-12 RX ORDER — ASCORBIC ACID 125 MG
5 TABLET,CHEWABLE ORAL NIGHTLY PRN
COMMUNITY

## 2023-10-12 RX ORDER — BUSPIRONE HYDROCHLORIDE 10 MG/1
10 TABLET ORAL 3 TIMES DAILY PRN
COMMUNITY

## 2023-10-12 RX ORDER — TAMSULOSIN HYDROCHLORIDE 0.4 MG/1
0.8 CAPSULE ORAL DAILY
COMMUNITY
Start: 2023-08-23

## 2023-10-12 NOTE — PROGRESS NOTES
Subjective:       Patient ID: Marcia Gregory is a 72 y.o. male.    Chief Complaint: Benign Prostatic Hypertrophy      HPI: 72-year-old male, new to [facility], presents for evaluation.    Patient has a history of BPH with obstruction.    He is currently on Flomax 0.4 mg daily.  However, his dose was recently increased to 0.8 mg daily.    Patient states that at times he has a weak stream.  He also complains of nocturia.  States he gets up 6 times a night.  Denies any pain or burning urination.  Denies any odor to the urine.  Denies any fever or body aches.  Denies any blood in urine.  He admits to drinking a large amount of coffee in the morning.  He drinks crystal light tea in the evenings.    [facility]  No other urinary complaints at this time.         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 Rescue    ALOGLIPTIN (NESINA) 25 MG TAB    Take 1 tablet by mouth once daily.    AMLODIPINE (NORVASC) 10 MG TABLET        BUSPIRONE (BUSPAR) 10 MG TABLET    Take 10 mg by mouth 3 (three) times daily as needed.    ENALAPRIL (VASOTEC) 20 MG TABLET    40 mg.    HYDROCHLOROTHIAZIDE (HYDRODIURIL) 25 MG TABLET    Take 25 mg by mouth.    MELATONIN 5 MG CHEW    Take 5 mg by mouth nightly as needed.    METFORMIN (GLUCOPHAGE-XR) 500 MG ER 24HR TABLET    Take 1 tablet by mouth 2 (two) times daily.    METOPROLOL SUCCINATE (TOPROL-XL) 50 MG 24 HR TABLET    Take 50 mg by mouth once  daily.    OMEPRAZOLE (PRILOSEC) 40 MG CAPSULE    Take 40 mg by mouth once daily.    SIMVASTATIN (ZOCOR) 40 MG TABLET    Take 40 mg by mouth every evening.    TAMSULOSIN (FLOMAX) 0.4 MG CAP    Take 0.8 mg by mouth once daily.        Past Social History:   Social History     Socioeconomic History    Marital status:    Tobacco Use    Smoking status: Former     Current packs/day: 0.00     Average packs/day: 1 pack/day for 51.0 years (51.0 ttl pk-yrs)     Types: Cigarettes     Start date: 1971     Quit date: 2022     Years since quittin.2    Smokeless tobacco: Never    Tobacco comments:     Patient states he is cutting down 6-7 cigarettes a day   Substance and Sexual Activity    Alcohol use: Not Currently     Comment: maybe once a year    Drug use: Never       Allergies:   Review of patient's allergies indicates:   Allergen Reactions    Adhesive Rash        Family History:   Family History   Problem Relation Age of Onset    Heart failure Father     Cancer Sister     Breast cancer Sister     Diabetes Brother         Review of Systems:  Review of Systems   Constitutional:  Negative for activity change and appetite change.   HENT:  Negative for congestion and dental problem.    Eyes:  Negative for visual disturbance.   Respiratory:  Negative for chest tightness and shortness of breath.    Cardiovascular:  Negative for chest pain.   Gastrointestinal:  Negative for abdominal distention and abdominal pain.   Genitourinary:  Positive for difficulty urinating. Negative for decreased urine volume, dysuria, enuresis, flank pain, frequency, genital sores, hematuria, penile discharge, penile pain, penile swelling, scrotal swelling, testicular pain and urgency.   Musculoskeletal:  Negative for back pain and neck pain.   Skin:  Negative for color change.   Neurological:  Negative for dizziness.   Hematological:  Negative for adenopathy.   Psychiatric/Behavioral:  Negative for agitation, behavioral problems and  confusion.        Physical Exam:  Physical Exam  Vitals and nursing note reviewed.   Constitutional:       Appearance: He is well-developed.   HENT:      Head: Normocephalic.   Eyes:      Pupils: Pupils are equal, round, and reactive to light.   Cardiovascular:      Rate and Rhythm: Normal rate and regular rhythm.      Heart sounds: Normal heart sounds.   Pulmonary:      Effort: Pulmonary effort is normal.      Breath sounds: Normal breath sounds.   Abdominal:      General: Bowel sounds are normal.      Palpations: Abdomen is soft.   Genitourinary:     Penis: Normal.       Prostate: Normal.      Rectum: Normal.      Comments: Prostate difficult to palpate due to large pannus.  Musculoskeletal:         General: Normal range of motion.      Cervical back: Normal range of motion and neck supple.   Skin:     General: Skin is warm and dry.   Neurological:      Mental Status: He is alert and oriented to person, place, and time.   Psychiatric:         Behavior: Behavior normal.       Urinalysis:  No blood.  No signs of infection.    Bladder scan:  3 cc    Assessment/Plan:   BPH with obstruction/nocturia:  Patient's bladder scan is good at 3 cc.  However, he has a weak stream and nocturia.    Patient instructed to increase his Flomax as directed by his primary.    Discussed tea, soda, and coffee use.  Also discussed decreasing evening fluids.    [facility]  Prostate difficult to palpate due to large pannus.      Will follow-up in 6-8 weeks for re-evaluation, sooner if needed.  Problem List Items Addressed This Visit    None  Visit Diagnoses       Benign prostatic hyperplasia with lower urinary tract symptoms, symptom details unspecified    -  Primary    Relevant Orders    POCT Urinalysis(Instrument)    POCT Bladder Scan    Nocturia

## 2023-10-13 LAB
ALBUMIN SERPL BCP-MCNC: 3.9 G/DL (ref 3.4–5)
ALBUMIN/GLOBULIN RATIO: 1.15 RATIO (ref 1.1–1.8)
ALP SERPL-CCNC: 84 U/L (ref 46–116)
ALT SERPL W P-5'-P-CCNC: 28 U/L (ref 12–78)
ANION GAP SERPL CALC-SCNC: 11 MMOL/L (ref 3–11)
AST SERPL-CCNC: 22 U/L (ref 15–37)
BASOPHILS NFR BLD: 0.7 % (ref 0–3)
BILIRUB SERPL-MCNC: 0.5 MG/DL (ref 0–1)
BUN SERPL-MCNC: 14 MG/DL (ref 7–18)
BUN/CREAT SERPL: 15.38 RATIO (ref 7–18)
CALCIUM SERPL-MCNC: 9.3 MG/DL (ref 8.8–10.5)
CHLORIDE SERPL-SCNC: 100 MMOL/L (ref 100–108)
CO2 SERPL-SCNC: 24 MMOL/L (ref 21–32)
CREAT SERPL-MCNC: 0.91 MG/DL (ref 0.7–1.3)
EOSINOPHIL NFR BLD: 0.8 % (ref 1–3)
ERYTHROCYTE [DISTWIDTH] IN BLOOD BY AUTOMATED COUNT: 13.8 % (ref 12.5–18)
GFR ESTIMATION: > 60
GLOBULIN: 3.4 G/DL (ref 2.3–3.5)
GLUCOSE SERPL-MCNC: 128 MG/DL (ref 70–110)
HCT VFR BLD AUTO: 41.7 % (ref 42–52)
HGB BLD-MCNC: 13.7 G/DL (ref 14–18)
LYMPHOCYTES NFR BLD: 30.1 % (ref 25–40)
MCH RBC QN AUTO: 25.8 PG (ref 27–31.2)
MCHC RBC AUTO-ENTMCNC: 32.9 G/DL (ref 31.8–35.4)
MCV RBC AUTO: 78.7 FL (ref 80–97)
MONOCYTES NFR BLD: 6.2 % (ref 1–15)
NEUTROPHILS # BLD AUTO: 5.3 10*3/UL (ref 1.8–7.7)
NEUTROPHILS NFR BLD: 61.7 % (ref 37–80)
NUCLEATED RED BLOOD CELLS: 0 %
PLATELETS: 216 10*3/UL (ref 142–424)
POTASSIUM SERPL-SCNC: 4.2 MMOL/L (ref 3.6–5.2)
PROT SERPL-MCNC: 7.3 G/DL (ref 6.4–8.2)
RBC # BLD AUTO: 5.3 10*6/UL (ref 4.7–6.1)
SODIUM BLD-SCNC: 135 MMOL/L (ref 135–145)
WBC # BLD: 8.6 10*3/UL (ref 4.6–10.2)

## 2023-10-17 ENCOUNTER — OFFICE VISIT (OUTPATIENT)
Dept: HEMATOLOGY/ONCOLOGY | Facility: CLINIC | Age: 73
End: 2023-10-17
Payer: OTHER GOVERNMENT

## 2023-10-17 VITALS
DIASTOLIC BLOOD PRESSURE: 76 MMHG | WEIGHT: 296 LBS | HEART RATE: 92 BPM | HEIGHT: 72 IN | RESPIRATION RATE: 16 BRPM | OXYGEN SATURATION: 95 % | SYSTOLIC BLOOD PRESSURE: 138 MMHG | BODY MASS INDEX: 40.09 KG/M2

## 2023-10-17 DIAGNOSIS — C34.90 MALIGNANT NEOPLASM OF UNSPECIFIED PART OF UNSPECIFIED BRONCHUS OR LUNG: Primary | ICD-10-CM

## 2023-10-17 DIAGNOSIS — C34.91 NSCLC OF RIGHT LUNG: ICD-10-CM

## 2023-10-17 PROCEDURE — 99214 PR OFFICE/OUTPT VISIT, EST, LEVL IV, 30-39 MIN: ICD-10-PCS | Mod: S$GLB,,, | Performed by: INTERNAL MEDICINE

## 2023-10-17 PROCEDURE — 99214 OFFICE O/P EST MOD 30 MIN: CPT | Mod: S$GLB,,, | Performed by: INTERNAL MEDICINE

## 2023-10-17 NOTE — PROGRESS NOTES
MEDICAL ONCOLOGY FOLLOW UP CONSULTATION NOTE    Patient ID: Marcia Gregory is a 72 y.o. male.    Chief Complaint: Lung Cancer    HPI:  Patient is a 71-year-old male active current smoker with past medical history of DVT, diabetes mellitus, hyperlipidemia, hypertension, CAD status post myocardial infarction with past surgical history of knee replacement and tonsillectomy was referred by Pulmonary after recent right upper lobe lung nodule core biopsy was consistent with moderately differentiated squamous cell carcinoma please see detailed pathology report below.  Patient reports having a PET scan done on 2022 which showed findings as below.  Patient presents to our clinic today 22 for further evaluation.      Imagin22    Chest:  There is a 12 mm nodule in the right upper lung field showing an increased radiotracer uptake with an SUV of 12.6    Abdomen:  Normal  Pelvis:  Normal  Osseous structures:  Normal    Impression:  Hypermetabolic nodule in the right upper lung field consistent with malignancy    Pathology:     22: pT1c pN0 M0 G2        22  RIGHT UPPER LOBE OF LUNG, NODULE, CORE BIOPSY:      - MODERATELY DIFFERENTIATED SQUAMOUS CELL CARCINOMA (NON-SMALL   CARCINOMA).   Comment:  Immunohistochemical staining is performed upon sections from block   1A.  The tumor cells demonstrate positivity for CK5/6, p40, and p63.   Staining is negative for TTF-1 and CK7.  All associated controls stain as   expected.         Past Medical History:   Diagnosis Date    Blood clot in vein     Diabetes mellitus, type 2     Hyperlipidemia     Hypertension     Myocardial infarction     NSCLC of right lung 2022    Skin cancer      Family History   Problem Relation Age of Onset    Heart failure Father     Cancer Sister     Breast cancer Sister     Diabetes Brother      Social History     Socioeconomic History    Marital status:    Tobacco Use    Smoking status: Former      Current packs/day: 0.00     Average packs/day: 1 pack/day for 51.0 years (51.0 ttl pk-yrs)     Types: Cigarettes     Start date: 1971     Quit date: 2022     Years since quittin.2    Smokeless tobacco: Never    Tobacco comments:     Patient states he is cutting down 6-7 cigarettes a day   Substance and Sexual Activity    Alcohol use: Not Currently     Comment: maybe once a year    Drug use: Never     Past Surgical History:   Procedure Laterality Date    cardiac stents      KNEE SURGERY      TONSILLECTOMY           Review of systems:  Review of Systems   Constitutional:  Negative for activity change, appetite change, chills, diaphoresis, fatigue and unexpected weight change.   HENT:  Negative for congestion, facial swelling, hearing loss, mouth sores, trouble swallowing and voice change.    Eyes:  Negative for photophobia, pain, discharge and itching.   Respiratory:  Negative for apnea, cough, choking, chest tightness and shortness of breath.    Cardiovascular:  Negative for chest pain, palpitations and leg swelling.   Gastrointestinal:  Negative for abdominal distention, abdominal pain, anal bleeding and blood in stool.   Endocrine: Negative for cold intolerance, heat intolerance, polydipsia and polyphagia.   Genitourinary:  Negative for difficulty urinating, dysuria, flank pain and hematuria.   Musculoskeletal:  Negative for arthralgias, back pain, joint swelling, myalgias, neck pain and neck stiffness.   Skin:  Negative for color change, pallor and wound.   Allergic/Immunologic: Negative for environmental allergies, food allergies and immunocompromised state.   Neurological:  Negative for dizziness, seizures, facial asymmetry, speech difficulty, light-headedness, numbness and headaches.   Hematological:  Negative for adenopathy. Does not bruise/bleed easily.   Psychiatric/Behavioral:  Negative for agitation, behavioral problems, confusion, decreased concentration and sleep disturbance.              Physical Exam  Vitals and nursing note reviewed.   Constitutional:       General: He is not in acute distress.     Appearance: Normal appearance. He is not ill-appearing.   HENT:      Head: Normocephalic and atraumatic.      Nose: No congestion or rhinorrhea.   Eyes:      General: No scleral icterus.     Extraocular Movements: Extraocular movements intact.      Pupils: Pupils are equal, round, and reactive to light.   Cardiovascular:      Rate and Rhythm: Normal rate and regular rhythm.      Pulses: Normal pulses.      Heart sounds: Normal heart sounds. No murmur heard.     No gallop.   Pulmonary:      Effort: Pulmonary effort is normal. No respiratory distress.      Breath sounds: Normal breath sounds. No stridor. No wheezing or rhonchi.   Abdominal:      General: Bowel sounds are normal. There is no distension.      Palpations: There is no mass.      Tenderness: There is no abdominal tenderness. There is no guarding.   Musculoskeletal:         General: No swelling, tenderness, deformity or signs of injury. Normal range of motion.      Cervical back: Normal range of motion and neck supple. No rigidity. No muscular tenderness.      Right lower leg: No edema.      Left lower leg: No edema.   Skin:     General: Skin is warm.      Coloration: Skin is not jaundiced or pale.      Findings: No bruising or lesion.   Neurological:      General: No focal deficit present.      Mental Status: He is alert and oriented to person, place, and time.      Cranial Nerves: No cranial nerve deficit.      Sensory: No sensory deficit.      Motor: No weakness.      Gait: Gait normal.   Psychiatric:         Mood and Affect: Mood normal.         Behavior: Behavior normal.         Thought Content: Thought content normal.              Vitals:    10/17/23 1103   BP: 138/76   Pulse: 92   Resp: 16     Body surface area is 2.61 meters squared.    Assessment/Plan:      ECOG 1    1. Moderately differentiated squamous cell carcinoma arising from  the right upper lobe of lung:    == cT1b, cN0, M0, Stage IA2.  PET scan done 05/16/2022 more than 2 months back showed clinical findings as above.  == Core biopsy done 07/20/2022 completed few days back showed findings as above.  I discussed with him further management options for early stage lung cancer.  PFTs showed mildly reduced at DLCO of 70% predicted likely due to active smoking otherwise- normal profile.  I will make referral to cardiothoracic surgery for further evaluation.  Ideally I would like a PET scan to be repeated since it has been more than 2 months since his prior PET scan, but even if this is not done I would like this patient to be evaluated by Cardiothoracic surgery for consideration of pathologic mediastinal lymph node evaluation and if an operable candidate to undergo surgical exploration and resection.  Based on surgical resection we will determine if there is a need for any adjuvant treatment.  For clinical stage I A 2 with negative margins only observation is indicated.   ==8/31/22: Review of PET/CT shows very minimal disease growth. Lung lesion now measuring 14 mm (12 mm prior).  He has seen cardiothoracic surgery at this time and will be scheduled for surgical resection in the coming weeks.  Will see him post surgery   ==11/14/22: s/p right upper lobectomy with mediastinal lymphadenopathy does section with negative nodes on 09/21/22 by Dr. Isaac for right upper lobe nodule which was biopsy-proven moderately differentiated squamous cell cancer. (pT1c pN0 M0).  This was complicated by hemothorax requiring VATS and evacuation of hematoma with atelectasis requiring bronchoscopy and brief mechanical ventilation.Patient has recovered well since then.  He still has supplemental oxygen with him that he uses as needed during ambulation.   == 10/17/23:  No evidence of recurrence on recent CT scan chest 09/23. Continue observation. Repeat Imaging per NCCN surveillance guidelines for Stage IA  squamous cell cancer with CT scan chest with IV contrast q 6 monthly for 3 years    2. Right intranasal lesion: WELL DIFFERENTIATED VERRUCOID SCC CAN'T CONFIRM INVASION:  == Evaluated at [facility] for nasal polyp at the end of July, s/p recision on 8/2/22.  == Continue to follow-up at [facility] in 6 months for MD visit  with CT Chest with IV contrast prior    Total time spent in counseling and discussion about further management options including relevant lab work, treatment,  prognosis, medications and intended side effects was more than 25 minutes. More than 50% of the time was spent on counseling and coordination of care.  I spent a total of 25 minutes on the day of the visit.This includes face to face time and non-face to face time preparing to see the patient (eg, review of tests), Obtaining and/or reviewing separately obtained history, Documenting clinical information in the electronic or other health record, Independently interpreting resultsand communicating results to the patient/family/caregiver, or Care coordination.

## 2023-12-07 ENCOUNTER — OFFICE VISIT (OUTPATIENT)
Dept: UROLOGY | Facility: CLINIC | Age: 73
End: 2023-12-07
Payer: OTHER GOVERNMENT

## 2023-12-07 VITALS
DIASTOLIC BLOOD PRESSURE: 74 MMHG | OXYGEN SATURATION: 95 % | SYSTOLIC BLOOD PRESSURE: 136 MMHG | HEART RATE: 71 BPM | RESPIRATION RATE: 20 BRPM | BODY MASS INDEX: 40.14 KG/M2 | HEIGHT: 72 IN

## 2023-12-07 DIAGNOSIS — N40.1 BPH WITH URINARY OBSTRUCTION: Primary | ICD-10-CM

## 2023-12-07 DIAGNOSIS — R35.1 NOCTURIA: ICD-10-CM

## 2023-12-07 DIAGNOSIS — N13.8 BPH WITH URINARY OBSTRUCTION: Primary | ICD-10-CM

## 2023-12-07 PROCEDURE — 99214 OFFICE O/P EST MOD 30 MIN: CPT | Mod: S$GLB,,, | Performed by: NURSE PRACTITIONER

## 2023-12-07 PROCEDURE — 99214 PR OFFICE/OUTPT VISIT, EST, LEVL IV, 30-39 MIN: ICD-10-PCS | Mod: S$GLB,,, | Performed by: NURSE PRACTITIONER

## 2023-12-07 RX ORDER — OXYBUTYNIN CHLORIDE 5 MG/1
5 TABLET ORAL 2 TIMES DAILY
Qty: 180 TABLET | Refills: 3 | Status: SHIPPED | OUTPATIENT
Start: 2023-12-07 | End: 2024-12-06

## 2023-12-07 RX ORDER — FINASTERIDE 5 MG/1
5 TABLET, FILM COATED ORAL
COMMUNITY
Start: 2023-10-05

## 2023-12-07 NOTE — PROGRESS NOTES
Subjective:       Patient ID: Marcia Gregory is a 73 y.o. male.    Chief Complaint: 8 wk f/u      HPI: 73-year-old male, established patient, presents for 8 week follow-up.    Patient has history BPH with obstruction he is maintained on Flomax 0.4 mg daily and Proscar 5 mg daily.    Patient complains of nocturia.  States he is up 5-6 times per night.    Did discuss decreasing evening fluids.  Also discussed tea, soda, and coffee.  Patient states he stops fluids 2-3 hours before bed.    He continues to have nocturia.  States during the day he does not have any significant frequency.  Denies any urgency.    No other urinary complaints at this time.         Past Medical History:   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    Skin cancer        Past Surgical Historical:   Past Surgical History:   Procedure Laterality Date    cardiac stents      KNEE SURGERY      TONSILLECTOMY          Medications:   Medication List with Changes/Refills   New Medications    OXYBUTYNIN (DITROPAN) 5 MG TAB    Take 1 tablet (5 mg total) by mouth 2 (two) times daily.   Current Medications    ALOGLIPTIN (NESINA) 25 MG TAB    Take 1 tablet by mouth once daily.    AMLODIPINE (NORVASC) 10 MG TABLET        BUSPIRONE (BUSPAR) 10 MG TABLET    Take 10 mg by mouth 3 (three) times daily as needed.    ENALAPRIL (VASOTEC) 20 MG TABLET    40 mg.    FINASTERIDE (PROSCAR) 5 MG TABLET    5 mg.    HYDROCHLOROTHIAZIDE (HYDRODIURIL) 25 MG TABLET    Take 25 mg by mouth.    MELATONIN 5 MG CHEW    Take 5 mg by mouth nightly as needed.    METFORMIN (GLUCOPHAGE-XR) 500 MG ER 24HR TABLET    Take 1 tablet by mouth 2 (two) times daily.    METOPROLOL SUCCINATE (TOPROL-XL) 50 MG 24 HR TABLET    Take 50 mg by mouth once daily.    OMEPRAZOLE (PRILOSEC) 40 MG CAPSULE    Take 40 mg by mouth once daily.    SIMVASTATIN (ZOCOR)  40 MG TABLET    Take 40 mg by mouth every evening.    TAMSULOSIN (FLOMAX) 0.4 MG CAP    Take 0.8 mg by mouth once daily.        Past Social History:   Social History     Socioeconomic History    Marital status:    Tobacco Use    Smoking status: Former     Current packs/day: 0.00     Average packs/day: 1 pack/day for 51.0 years (51.0 ttl pk-yrs)     Types: Cigarettes     Start date: 1971     Quit date: 2022     Years since quittin.3    Smokeless tobacco: Never    Tobacco comments:     Patient states he is cutting down 6-7 cigarettes a day   Substance and Sexual Activity    Alcohol use: Not Currently     Comment: maybe once a year    Drug use: Never       Allergies:   Review of patient's allergies indicates:   Allergen Reactions    Adhesive Rash        Family History:   Family History   Problem Relation Age of Onset    Heart failure Father     Cancer Sister     Breast cancer Sister     Diabetes Brother         Review of Systems:  Review of Systems   Constitutional:  Negative for activity change and appetite change.   HENT:  Negative for congestion and dental problem.    Eyes:  Negative for visual disturbance.   Respiratory:  Negative for chest tightness and shortness of breath.    Cardiovascular:  Negative for chest pain.   Gastrointestinal:  Negative for abdominal distention and abdominal pain.   Genitourinary:  Negative for decreased urine volume, difficulty urinating, dysuria, enuresis, flank pain, frequency, genital sores, hematuria, penile discharge, penile pain, penile swelling, scrotal swelling, testicular pain and urgency.   Musculoskeletal:  Negative for back pain and neck pain.   Skin:  Negative for color change.   Neurological:  Negative for dizziness.   Hematological:  Negative for adenopathy.   Psychiatric/Behavioral:  Negative for agitation, behavioral problems and confusion.        Physical Exam:  Physical Exam  Vitals and nursing note reviewed.   Constitutional:       Appearance:  He is well-developed.   HENT:      Head: Normocephalic.   Eyes:      Pupils: Pupils are equal, round, and reactive to light.   Cardiovascular:      Rate and Rhythm: Normal rate and regular rhythm.      Heart sounds: Normal heart sounds.   Pulmonary:      Effort: Pulmonary effort is normal.      Breath sounds: Normal breath sounds.   Abdominal:      General: Bowel sounds are normal.      Palpations: Abdomen is soft.   Musculoskeletal:         General: Normal range of motion.      Cervical back: Normal range of motion and neck supple.   Skin:     General: Skin is warm and dry.   Neurological:      Mental Status: He is alert and oriented to person, place, and time.   Psychiatric:         Behavior: Behavior normal.         Assessment/Plan:   BPH with obstruction/nocturia:  Patient's bladder scan at last visit was good at 3 cc.    Patient continue Flomax 0.4 mg daily and Proscar 5 mg daily.    Will start patient on oxybutynin 5 mg nightly.  Patient will increase to 10 mg if no improvement at the 5 mg dose.      Will plan follow-up in 3 months for re-evaluation, sooner if needed.  Problem List Items Addressed This Visit    None  Visit Diagnoses       BPH with urinary obstruction    -  Primary    Relevant Medications    oxybutynin (DITROPAN) 5 MG Tab    Nocturia        Relevant Medications    oxybutynin (DITROPAN) 5 MG Tab

## 2024-02-12 ENCOUNTER — TELEPHONE (OUTPATIENT)
Dept: PULMONOLOGY | Facility: CLINIC | Age: 74
End: 2024-02-12
Payer: OTHER GOVERNMENT

## 2024-02-23 ENCOUNTER — CLINICAL SUPPORT (OUTPATIENT)
Dept: PULMONOLOGY | Facility: CLINIC | Age: 74
End: 2024-02-23
Payer: OTHER GOVERNMENT

## 2024-02-23 ENCOUNTER — OFFICE VISIT (OUTPATIENT)
Dept: PULMONOLOGY | Facility: CLINIC | Age: 74
End: 2024-02-23
Payer: OTHER GOVERNMENT

## 2024-02-23 VITALS
DIASTOLIC BLOOD PRESSURE: 80 MMHG | HEIGHT: 72 IN | OXYGEN SATURATION: 97 % | RESPIRATION RATE: 20 BRPM | HEART RATE: 69 BPM | BODY MASS INDEX: 39.28 KG/M2 | SYSTOLIC BLOOD PRESSURE: 133 MMHG | WEIGHT: 290 LBS

## 2024-02-23 DIAGNOSIS — C34.91 NSCLC OF RIGHT LUNG: ICD-10-CM

## 2024-02-23 DIAGNOSIS — J96.11 CHRONIC HYPOXEMIC RESPIRATORY FAILURE: Primary | ICD-10-CM

## 2024-02-23 DIAGNOSIS — E66.01 SEVERE OBESITY (BMI 35.0-39.9) WITH COMORBIDITY: ICD-10-CM

## 2024-02-23 DIAGNOSIS — C34.90 MALIGNANT NEOPLASM OF UNSPECIFIED PART OF UNSPECIFIED BRONCHUS OR LUNG: ICD-10-CM

## 2024-02-23 DIAGNOSIS — R06.09 EXERTIONAL DYSPNEA: ICD-10-CM

## 2024-02-23 DIAGNOSIS — J98.4 RESTRICTIVE LUNG DISEASE: ICD-10-CM

## 2024-02-23 PROCEDURE — 94010 BREATHING CAPACITY TEST: CPT | Mod: S$GLB,,, | Performed by: INTERNAL MEDICINE

## 2024-02-23 PROCEDURE — 99214 OFFICE O/P EST MOD 30 MIN: CPT | Mod: 25,S$GLB,,

## 2024-02-23 PROCEDURE — 94726 PLETHYSMOGRAPHY LUNG VOLUMES: CPT | Mod: S$GLB,,, | Performed by: INTERNAL MEDICINE

## 2024-02-23 PROCEDURE — 94729 DIFFUSING CAPACITY: CPT | Mod: S$GLB,,, | Performed by: INTERNAL MEDICINE

## 2024-02-23 NOTE — ASSESSMENT & PLAN NOTE
PFT show mild restriction  He declines option of rescue inhalers to help with shortness of breath on exertion.  He can follow up in a year with repeat PFT or sooner if needed or changes his mind about inhaler therapy.

## 2024-02-23 NOTE — PROGRESS NOTES
Subjective:    Patient Identification:  Patient ID: Marcia Gregory is a 73 y.o. male.    Referring Provider:  No ref. provider found     Chief Complaint:  No chief complaint on file.      History of Present Illness:    Marcia Gregory is a 73 y.o. male who presents for follow-up mild exertional dyspnea after right upper lobectomy due to moderately differentiated squamous cell carcinoma and subsequent VATS for hemothorax.  Last visit he was given an albuterol inhaler see if this made a difference in his shortness of breath on exertion.  He says that he had to stop using the albuterol inhaler because it was giving him really bad anxiety attacks.  I discussed alternatives that may not raise his heart rate as much lead to anxiety, however he declined.  He states that overall he only gets short of breath if he is walking very long distances or doing heavy physical exertion.  He has also been working on losing weight.    Review of Systems:  Review of Systems   Constitutional:  Negative for fever, chills, appetite change, night sweats and weakness.   HENT:  Negative for postnasal drip, rhinorrhea, sore throat, trouble swallowing, voice change, congestion and ear pain.    Respiratory:  Negative for cough, hemoptysis, sputum production, choking, chest tightness, shortness of breath, wheezing, pleurisy, dyspnea on extertion, use of rescue inhaler and somnolence.    Cardiovascular:  Negative for chest pain, palpitations and leg swelling.   Genitourinary:  Negative for difficulty urinating and hematuria.   Endocrine:  Negative for polydipsia, polyphagia, cold intolerance, heat intolerance and polyuria.    Musculoskeletal:  Negative for joint swelling and myalgias.   Skin:  Negative for rash.   Gastrointestinal:  Negative for nausea, vomiting, abdominal pain and abdominal distention.   Neurological:  Negative for dizziness, syncope, light-headedness and headaches.   Psychiatric/Behavioral:  Negative for confusion and  sleep disturbance. The patient is not nervous/anxious.        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    Skin cancer        Family History:      Family History   Problem Relation Age of Onset    Heart failure Father     Cancer Sister     Breast cancer Sister     Diabetes Brother         Social History:      Past Surgical History:   Procedure Laterality Date    cardiac stents      KNEE SURGERY      TONSILLECTOMY         Physical Exam:  /80 (BP Location: Left arm, Patient Position: Sitting, BP Method: Medium (Automatic))   Pulse 69   Resp 20   Ht 6' (1.829 m)   Wt 131.5 kg (290 lb)   SpO2 97%   BMI 39.33 kg/m²   Physical Exam   Constitutional: He is oriented to person, place, and time. He appears not cachectic. No distress.   HENT:   Head: Normocephalic.   Right Ear: External ear normal.   Left Ear: External ear normal.   Nose: Nose normal. No mucosal edema. No polyps.   Mouth/Throat: Oropharynx is clear and moist. Normal dentition. No oropharyngeal exudate. Mallampati Score: III.   Neck: No JVD present. No tracheal deviation present. No thyromegaly present.   Cardiovascular: Normal rate, regular rhythm, normal heart sounds and intact distal pulses. Exam reveals no gallop and no friction rub.   No murmur heard.  Pulmonary/Chest: Normal expansion, symmetric chest wall expansion, effort normal and breath sounds normal. No stridor. No respiratory distress. He has no decreased breath sounds. He has no wheezes. He has no rhonchi. He has no rales. Chest wall is not dull to percussion. He exhibits no tenderness. Negative for egophony. Negative for tactile fremitus.   Abdominal: Soft. Bowel sounds are normal. He exhibits no  "distension and no mass. There is no hepatosplenomegaly. There is no abdominal tenderness. There is no rebound and no guarding. No hernia.   Musculoskeletal:         General: No tenderness, deformity or edema. Normal range of motion.      Cervical back: Normal range of motion and neck supple.   Lymphadenopathy: No supraclavicular adenopathy is present.     He has no cervical adenopathy.     He has no axillary adenopathy.   Neurological: He is alert and oriented to person, place, and time. He has normal reflexes. He displays normal reflexes. No cranial nerve deficit. He exhibits normal muscle tone.   Skin: Skin is warm and dry. No rash noted. He is not diaphoretic. No cyanosis or erythema. No pallor. Nails show no clubbing.   Psychiatric: He has a normal mood and affect. His behavior is normal. Judgment and thought content normal.           No results found for: "PREFEV1", "MNA8FNNBKF", "PREFVC", "FVCPREREF", "DMZUDM8CWG", "VTR6QDZGLVF", "DEKG8QXK", "EQIB4FSE", "PREDLCO", "DLCOSBPRRF", "DLCOADJPRE", "DLCOCSBRPRRF", "POSTFEV1", "POSTFVC", "UVEFUCH2PBT"   CT Chest With Contrast                                RADIOLOGY REPORT        PT NAME: FAIZA ALAS      [facility]     : 1950 M 72             [address]    ACCT: [id number]                                              [facility] Rec #: [id number]                                        [id number]    [facility]             Procedure: CHEST THORAX  W CONT    REQUISITION #: [id number]      REPORT #: [id number]           DATE OF EXAM: 23    TIME OF EXAM: 1415       CHEST THORAX  W CONT        CLINICAL INDICATION: MALIGNANT NEOPLASM OF UNSP PART OF UNSP BRONCHUS OR   LUNG: MALIGNANT NEOPLASM OF UNSP PART OF UNSP BRONCHUS OR LUNG. Lung   carcinoma, restaging        COMPARISON: 2023        TECHNIQUE: A CT scan of the chest was performed after IV contrast   administration. Automated exposure " control was used for dose reduction.        FINDINGS: There are stable postsurgical changes of the right lung. There are   no suspicious lung lesions. There is no mediastinal or hilar adenopathy. No    pleural effusion or pneumothorax. No suspicious bone lesions.        Sections through the upper abdomen demonstrate no abnormality.        IMPRESSION: Stable postsurgical changes of the right lung. There is no   active chest disease.        Signer Name: Mele Fournier MD    Signed: 9/15/2023 9:35 AM CDT    ()        DICTATING PHYSICIAN:  MELE FOURNIER MD                   Date Dictated: 09/14/23 1335        Signed By:  MELE FOURNIER MD     Date Signed:  09/15/23 0939     CC: MECHE GARCIA MD ; MECHE GARCIA MD      ADMITTING PHYSICIAN:                                                                                                    ORDERING PHY: MECHE GARCIA MD                                                                                                                                                      ATTENDING PHY: MECHE GARCIA MD    Patient Status:  REG CLI    Admit Service Date: 09/14/23                Accessory Clinical Data:  Chest x-ray:    CT scan:     PFTs:  Consistent with mild restriction    6MWT:     TTE:    Lab data:    All radiographic imaging of the chest, PFT tracings/data, and 6MWT data have been independently reviewed and interpreted unless otherwise specified.     Assessment and Plan:      Problem List Items Addressed This Visit          Pulmonary    Restrictive lung disease    Current Assessment & Plan     PFT show mild restriction  He declines option of rescue inhalers to help with shortness of breath on exertion.  He can follow up in a year with repeat PFT or sooner if needed or changes his mind about inhaler therapy.         Relevant Orders    Complete PFT w/ bronchodilator    RESOLVED: Chronic hypoxemic respiratory failure - Primary       Oncology    NSCLC of right lung        Endocrine    Severe obesity (BMI 35.0-39.9) with comorbidity     Other Visit Diagnoses       Malignant neoplasm of unspecified part of unspecified bronchus or lung               Orders Placed This Encounter   Procedures    Complete PFT w/ bronchodilator     Standing Status:   Future     Standing Expiration Date:   2/23/2025     Order Specific Question:   Release to patient     Answer:   Immediate            Follow up in about 1 year (around 2/23/2025) for Repeat PFT.

## 2024-03-07 ENCOUNTER — OFFICE VISIT (OUTPATIENT)
Dept: UROLOGY | Facility: CLINIC | Age: 74
End: 2024-03-07
Payer: OTHER GOVERNMENT

## 2024-03-07 VITALS — BODY MASS INDEX: 37.93 KG/M2 | WEIGHT: 280 LBS | HEIGHT: 72 IN

## 2024-03-07 DIAGNOSIS — R35.1 NOCTURIA: ICD-10-CM

## 2024-03-07 DIAGNOSIS — N40.1 BPH WITH URINARY OBSTRUCTION: Primary | ICD-10-CM

## 2024-03-07 DIAGNOSIS — N13.8 BPH WITH URINARY OBSTRUCTION: Primary | ICD-10-CM

## 2024-03-07 PROCEDURE — 99213 OFFICE O/P EST LOW 20 MIN: CPT | Mod: S$GLB,,, | Performed by: NURSE PRACTITIONER

## 2024-03-07 NOTE — PROGRESS NOTES
Subjective:       Patient ID: Marcia Gregory is a 73 y.o. male.    Chief Complaint: No chief complaint on file.      HPI: 73-year-old male, established patient, presents for 3 month visit.    Patient has history of BPH with obstruction.  Patient is on Flomax 0.8 mg daily.  At initial visit in October we added Proscar 5 mg.    At his follow-up he was doing well with good bladder scan.  However, he was having nocturia.  At that time he was getting up 5-6 times per night.    We then started the patient on oxybutynin 5 mg nightly.  Patient states that with oxybutynin 5 mg nightly his symptoms have improved.  May get up 1-2 times per night.  Often times that depends upon his fluid intake in the evening.    He denies any significant frequency, urgency, or nocturia.  Denies any difficulty voiding.  No other urinary complaints this time.         Past Medical History:   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    Skin cancer        Past Surgical Historical:   Past Surgical History:   Procedure Laterality Date    cardiac stents      KNEE SURGERY      TONSILLECTOMY          Medications:   Medication List with Changes/Refills   Current Medications    ALOGLIPTIN (NESINA) 25 MG TAB    Take 1 tablet by mouth once daily.    AMLODIPINE (NORVASC) 10 MG TABLET        BUSPIRONE (BUSPAR) 10 MG TABLET    Take 10 mg by mouth 3 (three) times daily as needed.    ENALAPRIL (VASOTEC) 20 MG TABLET    40 mg.    FINASTERIDE (PROSCAR) 5 MG TABLET    5 mg.    HYDROCHLOROTHIAZIDE (HYDRODIURIL) 25 MG TABLET    Take 25 mg by mouth.    MELATONIN 5 MG CHEW    Take 5 mg by mouth nightly as needed.    METFORMIN (GLUCOPHAGE-XR) 500 MG ER 24HR TABLET    Take 1 tablet by mouth 2 (two) times daily.    METOPROLOL SUCCINATE (TOPROL-XL) 50 MG 24 HR TABLET    Take 50 mg by mouth once daily.    OMEPRAZOLE  (PRILOSEC) 40 MG CAPSULE    Take 40 mg by mouth once daily.    OXYBUTYNIN (DITROPAN) 5 MG TAB    Take 1 tablet (5 mg total) by mouth 2 (two) times daily.    SIMVASTATIN (ZOCOR) 40 MG TABLET    Take 40 mg by mouth every evening.    TAMSULOSIN (FLOMAX) 0.4 MG CAP    Take 0.8 mg by mouth once daily.        Past Social History:   Social History     Socioeconomic History    Marital status:    Tobacco Use    Smoking status: Former     Current packs/day: 0.00     Average packs/day: 1 pack/day for 51.0 years (51.0 ttl pk-yrs)     Types: Cigarettes     Start date: 1971     Quit date: 2022     Years since quittin.6    Smokeless tobacco: Never    Tobacco comments:     Patient states he is cutting down 6-7 cigarettes a day   Substance and Sexual Activity    Alcohol use: Not Currently     Comment: maybe once a year    Drug use: Never       Allergies:   Review of patient's allergies indicates:   Allergen Reactions    Adhesive Rash        Family History:   Family History   Problem Relation Age of Onset    Heart failure Father     Cancer Sister     Breast cancer Sister     Diabetes Brother         Review of Systems:  Review of Systems   Constitutional:  Negative for activity change and appetite change.   HENT:  Negative for congestion and dental problem.    Respiratory:  Negative for chest tightness and shortness of breath.    Cardiovascular:  Negative for chest pain.   Gastrointestinal:  Negative for abdominal distention and abdominal pain.   Genitourinary:  Negative for decreased urine volume, difficulty urinating, dysuria, enuresis, flank pain, frequency, genital sores, hematuria, penile discharge, penile pain, penile swelling, scrotal swelling, testicular pain and urgency.   Musculoskeletal:  Negative for back pain and neck pain.   Neurological:  Negative for dizziness.   Hematological:  Negative for adenopathy.   Psychiatric/Behavioral:  Negative for agitation, behavioral problems and confusion.         Physical Exam:  Physical Exam  Vitals and nursing note reviewed.   Constitutional:       Appearance: He is well-developed.   HENT:      Head: Normocephalic.   Cardiovascular:      Rate and Rhythm: Normal rate and regular rhythm.      Heart sounds: Normal heart sounds.   Pulmonary:      Effort: Pulmonary effort is normal.      Breath sounds: Normal breath sounds.   Abdominal:      General: Bowel sounds are normal.      Palpations: Abdomen is soft.   Skin:     General: Skin is warm and dry.   Neurological:      Mental Status: He is alert and oriented to person, place, and time.         Assessment/Plan:   BPH with obstruction:  Patient is doing well Flomax 0.8 mg daily and Proscar 5 mg daily.      Nocturia: Patient has noticed improvement with oxybutynin 5 mg nightly.    Plan follow-up in 6 months, sooner if needed.    If doing well at that time will move to yearly.  Problem List Items Addressed This Visit    None  Visit Diagnoses       BPH with urinary obstruction    -  Primary    Nocturia

## 2024-04-01 DIAGNOSIS — C34.91 NSCLC OF RIGHT LUNG: Primary | ICD-10-CM

## 2024-04-07 ENCOUNTER — PATIENT MESSAGE (OUTPATIENT)
Dept: HEMATOLOGY/ONCOLOGY | Facility: CLINIC | Age: 74
End: 2024-04-07
Payer: OTHER GOVERNMENT

## 2024-04-09 ENCOUNTER — PATIENT MESSAGE (OUTPATIENT)
Dept: HEMATOLOGY/ONCOLOGY | Facility: CLINIC | Age: 74
End: 2024-04-09
Payer: OTHER GOVERNMENT

## 2024-04-10 ENCOUNTER — PATIENT MESSAGE (OUTPATIENT)
Dept: HEMATOLOGY/ONCOLOGY | Facility: CLINIC | Age: 74
End: 2024-04-10
Payer: OTHER GOVERNMENT

## 2024-04-20 ENCOUNTER — PATIENT MESSAGE (OUTPATIENT)
Dept: HEMATOLOGY/ONCOLOGY | Facility: CLINIC | Age: 74
End: 2024-04-20
Payer: OTHER GOVERNMENT

## 2024-04-22 DIAGNOSIS — C34.11 MALIGNANT NEOPLASM OF UPPER LOBE OF RIGHT LUNG: Primary | ICD-10-CM

## 2024-04-25 ENCOUNTER — TELEPHONE (OUTPATIENT)
Dept: HEMATOLOGY/ONCOLOGY | Facility: CLINIC | Age: 74
End: 2024-04-25
Payer: OTHER GOVERNMENT

## 2024-04-25 NOTE — TELEPHONE ENCOUNTER
Called patient and notified that [facility] was received and faxed to [facility] with CT order for scheduling.  Patient advised to contact central scheduling if he has not been contacted to schedule.  Patient advised to call clinic to notify of date CT is scheduled so follow up for results in clinic can be scheduled.  Patient expressed understanding.     4/25/24 @11:51am

## 2024-05-01 ENCOUNTER — OFFICE VISIT (OUTPATIENT)
Dept: HEMATOLOGY/ONCOLOGY | Facility: CLINIC | Age: 74
End: 2024-05-01
Payer: OTHER GOVERNMENT

## 2024-05-01 VITALS
BODY MASS INDEX: 38.8 KG/M2 | DIASTOLIC BLOOD PRESSURE: 84 MMHG | WEIGHT: 286.13 LBS | HEART RATE: 75 BPM | OXYGEN SATURATION: 94 % | SYSTOLIC BLOOD PRESSURE: 145 MMHG

## 2024-05-01 DIAGNOSIS — C34.11 MALIGNANT NEOPLASM OF UPPER LOBE OF RIGHT LUNG: Primary | ICD-10-CM

## 2024-05-01 LAB
ALBUMIN SERPL BCP-MCNC: 3.5 G/DL (ref 3.4–5)
ALBUMIN/GLOBULIN RATIO: 0.9 RATIO (ref 1.1–1.8)
ALP SERPL-CCNC: 83 U/L (ref 46–116)
ALT SERPL W P-5'-P-CCNC: 31 U/L (ref 12–78)
ANION GAP SERPL CALC-SCNC: 9 MMOL/L (ref 3–11)
AST SERPL-CCNC: 21 U/L (ref 15–37)
BASOPHILS NFR BLD: 0.5 % (ref 0–3)
BILIRUB SERPL-MCNC: 0.4 MG/DL (ref 0–1)
BUN SERPL-MCNC: 12 MG/DL (ref 7–18)
BUN/CREAT SERPL: 11.32 RATIO (ref 7–18)
CALCIUM SERPL-MCNC: 9 MG/DL (ref 8.8–10.5)
CHLORIDE SERPL-SCNC: 101 MMOL/L (ref 100–108)
CO2 SERPL-SCNC: 26 MMOL/L (ref 21–32)
CREAT SERPL-MCNC: 1.06 MG/DL (ref 0.7–1.3)
EOSINOPHIL NFR BLD: 1.1 % (ref 1–3)
ERYTHROCYTE [DISTWIDTH] IN BLOOD BY AUTOMATED COUNT: 13.9 % (ref 12.5–18)
GFR ESTIMATION: > 60
GLOBULIN: 3.9 G/DL (ref 2.3–3.5)
GLUCOSE SERPL-MCNC: 129 MG/DL (ref 70–110)
HCT VFR BLD AUTO: 43.1 % (ref 42–52)
HGB BLD-MCNC: 13.6 G/DL (ref 14–18)
LYMPHOCYTES NFR BLD: 28.4 % (ref 25–40)
MCH RBC QN AUTO: 25.9 PG (ref 27–31.2)
MCHC RBC AUTO-ENTMCNC: 31.6 G/DL (ref 31.8–35.4)
MCV RBC AUTO: 82.1 FL (ref 80–97)
MONOCYTES NFR BLD: 5.8 % (ref 1–15)
NEUTROPHILS # BLD AUTO: 5.1 10*3/UL (ref 1.8–7.7)
NEUTROPHILS NFR BLD: 63.7 % (ref 37–80)
NUCLEATED RED BLOOD CELLS: 0 %
PLATELETS: 209 10*3/UL (ref 142–424)
POTASSIUM SERPL-SCNC: 4.4 MMOL/L (ref 3.6–5.2)
PROT SERPL-MCNC: 7.4 G/DL (ref 6.4–8.2)
RBC # BLD AUTO: 5.25 10*6/UL (ref 4.7–6.1)
SODIUM BLD-SCNC: 136 MMOL/L (ref 135–145)
WBC # BLD: 8 10*3/UL (ref 4.6–10.2)

## 2024-05-01 PROCEDURE — 99214 OFFICE O/P EST MOD 30 MIN: CPT | Mod: S$GLB,,, | Performed by: INTERNAL MEDICINE

## 2024-05-01 NOTE — PROGRESS NOTES
MEDICAL ONCOLOGY FOLLOW UP CONSULTATION NOTE    Patient ID: Marcia Gregory is a 73 y.o. male.    Chief Complaint: Lung Cancer    HPI:  Patient is a 71-year-old male active current smoker with past medical history of DVT, diabetes mellitus, hyperlipidemia, hypertension, CAD status post myocardial infarction with past surgical history of knee replacement and tonsillectomy was referred by Pulmonary after recent right upper lobe lung nodule core biopsy was consistent with moderately differentiated squamous cell carcinoma please see detailed pathology report below.  Patient reports having a PET scan done on 2022 which showed findings as below.  Patient presents to our clinic today 22 for further evaluation.      Imagin22    Chest:  There is a 12 mm nodule in the right upper lung field showing an increased radiotracer uptake with an SUV of 12.6    Abdomen:  Normal  Pelvis:  Normal  Osseous structures:  Normal    Impression:  Hypermetabolic nodule in the right upper lung field consistent with malignancy    Pathology:     22: pT1c pN0 M0 G2        22  RIGHT UPPER LOBE OF LUNG, NODULE, CORE BIOPSY:      - MODERATELY DIFFERENTIATED SQUAMOUS CELL CARCINOMA (NON-SMALL   CARCINOMA).   Comment:  Immunohistochemical staining is performed upon sections from block   1A.  The tumor cells demonstrate positivity for CK5/6, p40, and p63.   Staining is negative for TTF-1 and CK7.  All associated controls stain as   expected.         Past Medical History:   Diagnosis Date    Blood clot in vein     Diabetes mellitus, type 2     Hyperlipidemia     Hypertension     Myocardial infarction     NSCLC of right lung 2022    Skin cancer      Family History   Problem Relation Name Age of Onset    Heart failure Father      Cancer Sister      Breast cancer Sister      Diabetes Brother       Social History     Socioeconomic History    Marital status:    Tobacco Use    Smoking status:  Former     Current packs/day: 0.00     Average packs/day: 1 pack/day for 51.0 years (51.0 ttl pk-yrs)     Types: Cigarettes     Start date: 1971     Quit date: 2022     Years since quittin.7    Smokeless tobacco: Never    Tobacco comments:     Patient states he is cutting down 6-7 cigarettes a day   Substance and Sexual Activity    Alcohol use: Not Currently     Comment: maybe once a year    Drug use: Never     Past Surgical History:   Procedure Laterality Date    cardiac stents      KNEE SURGERY      TONSILLECTOMY           Review of systems:  Review of Systems   Constitutional:  Negative for activity change, appetite change, chills, diaphoresis, fatigue and unexpected weight change.   HENT:  Negative for congestion, facial swelling, hearing loss, mouth sores, trouble swallowing and voice change.    Eyes:  Negative for photophobia, pain, discharge and itching.   Respiratory:  Negative for apnea, cough, choking, chest tightness and shortness of breath.    Cardiovascular:  Negative for chest pain, palpitations and leg swelling.   Gastrointestinal:  Negative for abdominal distention, abdominal pain, anal bleeding and blood in stool.   Endocrine: Negative for cold intolerance, heat intolerance, polydipsia and polyphagia.   Genitourinary:  Negative for difficulty urinating, dysuria, flank pain and hematuria.   Musculoskeletal:  Negative for arthralgias, back pain, joint swelling, myalgias, neck pain and neck stiffness.   Skin:  Negative for color change, pallor and wound.   Allergic/Immunologic: Negative for environmental allergies, food allergies and immunocompromised state.   Neurological:  Negative for dizziness, seizures, facial asymmetry, speech difficulty, light-headedness, numbness and headaches.   Hematological:  Negative for adenopathy. Does not bruise/bleed easily.   Psychiatric/Behavioral:  Negative for agitation, behavioral problems, confusion, decreased concentration and sleep disturbance.              Physical Exam  Vitals and nursing note reviewed.   Constitutional:       General: He is not in acute distress.     Appearance: Normal appearance. He is not ill-appearing.   HENT:      Head: Normocephalic and atraumatic.      Nose: No congestion or rhinorrhea.   Eyes:      General: No scleral icterus.     Extraocular Movements: Extraocular movements intact.      Pupils: Pupils are equal, round, and reactive to light.   Cardiovascular:      Rate and Rhythm: Normal rate and regular rhythm.      Pulses: Normal pulses.      Heart sounds: Normal heart sounds. No murmur heard.     No gallop.   Pulmonary:      Effort: Pulmonary effort is normal. No respiratory distress.      Breath sounds: Normal breath sounds. No stridor. No wheezing or rhonchi.   Abdominal:      General: Bowel sounds are normal. There is no distension.      Palpations: There is no mass.      Tenderness: There is no abdominal tenderness. There is no guarding.   Musculoskeletal:         General: No swelling, tenderness, deformity or signs of injury. Normal range of motion.      Cervical back: Normal range of motion and neck supple. No rigidity. No muscular tenderness.      Right lower leg: No edema.      Left lower leg: No edema.   Skin:     General: Skin is warm.      Coloration: Skin is not jaundiced or pale.      Findings: No bruising or lesion.   Neurological:      General: No focal deficit present.      Mental Status: He is alert and oriented to person, place, and time.      Cranial Nerves: No cranial nerve deficit.      Sensory: No sensory deficit.      Motor: No weakness.      Gait: Gait normal.   Psychiatric:         Mood and Affect: Mood normal.         Behavior: Behavior normal.         Thought Content: Thought content normal.              Vitals:    05/01/24 1336   BP: (!) 145/84   Pulse: 75       Body surface area is 2.57 meters squared.    Assessment/Plan:      ECOG 1    1. Moderately differentiated squamous cell carcinoma arising  from the right upper lobe of lung:    == cT1b, cN0, M0, Stage IA2.  PET scan done 05/16/2022 more than 2 months back showed clinical findings as above.  == Core biopsy done 07/20/2022 completed few days back showed findings as above.  I discussed with him further management options for early stage lung cancer.  PFTs showed mildly reduced at DLCO of 70% predicted likely due to active smoking otherwise- normal profile.  I will make referral to cardiothoracic surgery for further evaluation.  Ideally I would like a PET scan to be repeated since it has been more than 2 months since his prior PET scan, but even if this is not done I would like this patient to be evaluated by Cardiothoracic surgery for consideration of pathologic mediastinal lymph node evaluation and if an operable candidate to undergo surgical exploration and resection.  Based on surgical resection we will determine if there is a need for any adjuvant treatment.  For clinical stage I A 2 with negative margins only observation is indicated.   ==8/31/22: Review of PET/CT shows very minimal disease growth. Lung lesion now measuring 14 mm (12 mm prior).  He has seen cardiothoracic surgery at this time and will be scheduled for surgical resection in the coming weeks.  Will see him post surgery   ==11/14/22: s/p right upper lobectomy with mediastinal lymphadenopathy does section with negative nodes on 09/21/22 by Dr. Isaac for right upper lobe nodule which was biopsy-proven moderately differentiated squamous cell cancer. (pT1c pN0 M0).  This was complicated by hemothorax requiring VATS and evacuation of hematoma with atelectasis requiring bronchoscopy and brief mechanical ventilation.Patient has recovered well since then.  He still has supplemental oxygen with him that he uses as needed during ambulation.   == 5/1/24:  No evidence of recurrence on recent CT scan chest 04/30. Continue observation. Repeat Imaging per NCCN surveillance guidelines for Stage IA  squamous cell cancer with CT scan chest with IV contrast q 6 monthly for 3 years. Next CT scan due in Nov 2024    2. Right intranasal lesion: WELL DIFFERENTIATED VERRUCOID SCC CAN'T CONFIRM INVASION:  == Evaluated at [facility] for nasal polyp at the end of July, s/p recision on 8/2/22.  == Continue to follow-up at [facility] in 6 months for MD visit  with CT Chest with IV contrast prior    Total time spent in counseling and discussion about further management options including relevant lab work, treatment,  prognosis, medications and intended side effects was more than 30 minutes. More than 50% of the time was spent on counseling and coordination of care.  I spent a total of 30 minutes on the day of the visit.This includes face to face time and non-face to face time preparing to see the patient (eg, review of tests), Obtaining and/or reviewing separately obtained history, Documenting clinical information in the electronic or other health record, Independently interpreting resultsand communicating results to the patient/family/caregiver, or Care coordination.

## 2024-05-17 ENCOUNTER — PATIENT MESSAGE (OUTPATIENT)
Dept: UROLOGY | Facility: CLINIC | Age: 74
End: 2024-05-17
Payer: OTHER GOVERNMENT

## 2024-09-06 ENCOUNTER — OFFICE VISIT (OUTPATIENT)
Dept: UROLOGY | Facility: CLINIC | Age: 74
End: 2024-09-06
Payer: MEDICARE

## 2024-09-06 VITALS
DIASTOLIC BLOOD PRESSURE: 70 MMHG | SYSTOLIC BLOOD PRESSURE: 112 MMHG | OXYGEN SATURATION: 98 % | HEIGHT: 72 IN | WEIGHT: 286 LBS | BODY MASS INDEX: 38.74 KG/M2 | HEART RATE: 67 BPM

## 2024-09-06 DIAGNOSIS — R35.1 NOCTURIA: ICD-10-CM

## 2024-09-06 DIAGNOSIS — N13.8 BPH WITH URINARY OBSTRUCTION: Primary | ICD-10-CM

## 2024-09-06 DIAGNOSIS — N40.1 BPH WITH URINARY OBSTRUCTION: Primary | ICD-10-CM

## 2024-09-06 RX ORDER — SIMVASTATIN 80 MG/1
40 TABLET, FILM COATED ORAL
COMMUNITY
Start: 2024-05-28

## 2024-09-06 RX ORDER — METOPROLOL SUCCINATE 100 MG/1
50 TABLET, EXTENDED RELEASE ORAL
COMMUNITY
Start: 2024-04-11

## 2024-09-06 RX ORDER — ASPIRIN 81 MG/1
81 TABLET ORAL DAILY
COMMUNITY

## 2024-09-06 RX ORDER — SEMAGLUTIDE 0.68 MG/ML
INJECTION, SOLUTION SUBCUTANEOUS
COMMUNITY
Start: 2024-08-29

## 2024-09-06 NOTE — PROGRESS NOTES
Subjective:       Patient ID: Marcia Gregory is a 73 y.o. male.    Chief Complaint: No chief complaint on file.      HPI: 73-year-old male, established patient, presents for 6 month visit.    Patient has history BPH with obstruction.  He is maintained on Flomax 0.8 mg daily and Proscar 5 mg daily.    He also has history of nocturia.  He is on oxybutynin 5 mg nightly.    Patient states he is doing pretty well.  He may get up 1 or 2 times a night.  Occasionally he may get up 3 times.    States he has some frequency during the day.  He drives a dump truck.  States in the days he is working he will have to void often.    He does admit to drinking 32 oz soda on those days as well as 2 bottles water.    Denies any pain or burning urination.  Denies any odor urine.  Denies any fever or body aches.  Denies any blood in urine.  Denies any unexpected weight loss.  Denies any new onset bone pain.    No other urinary complaints at this time.      [facility]    [facility] and [facility].         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    AMLODIPINE (NORVASC) 10 MG TABLET        ASPIRIN (ECOTRIN) 81 MG EC TABLET    Take 81 mg by mouth once daily.    BUSPIRONE (BUSPAR) 10 MG TABLET    Take 10 mg by mouth 3 (three) times daily as needed.    ENALAPRIL (VASOTEC) 20 MG TABLET    40 mg.    FINASTERIDE (PROSCAR) 5 MG TABLET    5 mg.    HYDROCHLOROTHIAZIDE (HYDRODIURIL) 25 MG TABLET    Take 25 mg  by mouth.    MELATONIN 5 MG CHEW    Take 5 mg by mouth nightly as needed.    METFORMIN (GLUCOPHAGE-XR) 500 MG ER 24HR TABLET    Take 1 tablet by mouth 2 (two) times daily.    METOPROLOL SUCCINATE (TOPROL-XL) 100 MG 24 HR TABLET    Take 50 mg by mouth.    METOPROLOL SUCCINATE (TOPROL-XL) 50 MG 24 HR TABLET    Take 50 mg by mouth once daily.    OMEPRAZOLE (PRILOSEC) 40 MG CAPSULE    Take 40 mg by mouth once daily.    OXYBUTYNIN (DITROPAN) 5 MG TAB    Take 1 tablet (5 mg total) by mouth 2 (two) times daily.    OZEMPIC 0.25 MG OR 0.5 MG (2 MG/3 ML) PEN INJECTOR        SIMVASTATIN (ZOCOR) 40 MG TABLET    Take 40 mg by mouth every evening.    SIMVASTATIN (ZOCOR) 80 MG TABLET    Take 40 mg by mouth.    TAMSULOSIN (FLOMAX) 0.4 MG CAP    Take 0.8 mg by mouth once daily.        Past Social History:   Social History     Socioeconomic History    Marital status:    Tobacco Use    Smoking status: Former     Current packs/day: 0.00     Average packs/day: 1 pack/day for 51.0 years (51.0 ttl pk-yrs)     Types: Cigarettes     Start date: 1971     Quit date: 2022     Years since quittin.1    Smokeless tobacco: Never    Tobacco comments:     Patient states he is cutting down 6-7 cigarettes a day   Substance and Sexual Activity    Alcohol use: Not Currently     Comment: maybe once a year    Drug use: Never       Allergies:   Review of patient's allergies indicates:   Allergen Reactions    Adhesive Rash        Family History:   Family History   Problem Relation Name Age of Onset    Heart failure Father      Cancer Sister      Breast cancer Sister      Diabetes Brother          Review of Systems:  Review of Systems   Constitutional:  Negative for activity change and appetite change.   HENT:  Negative for congestion and dental problem.    Respiratory:  Negative for chest tightness and shortness of breath.    Cardiovascular:  Negative for chest pain.   Gastrointestinal:  Negative for abdominal distention and abdominal  pain.   Genitourinary:  Negative for decreased urine volume, difficulty urinating, dysuria, enuresis, flank pain, frequency, genital sores, hematuria, penile discharge, penile pain, penile swelling, scrotal swelling, testicular pain and urgency.   Musculoskeletal:  Negative for back pain and neck pain.   Neurological:  Negative for dizziness.   Hematological:  Negative for adenopathy.   Psychiatric/Behavioral:  Negative for agitation, behavioral problems and confusion.        Physical Exam:  Physical Exam  Vitals and nursing note reviewed.   Constitutional:       Appearance: He is well-developed.   HENT:      Head: Normocephalic.   Cardiovascular:      Rate and Rhythm: Normal rate and regular rhythm.      Heart sounds: Normal heart sounds.   Pulmonary:      Effort: Pulmonary effort is normal.      Breath sounds: Normal breath sounds.   Abdominal:      General: Bowel sounds are normal.      Palpations: Abdomen is soft.   Skin:     General: Skin is warm and dry.   Neurological:      Mental Status: He is alert and oriented to person, place, and time.       Bladder scan:  40 cc    Assessment/Plan:   1. BPH with obstruction:  Patient is doing Flomax 0.8 mg daily and Proscar 5 mg daily.  Patient will continue as directed.    [facility].    [facility] by [facility] and [facility]CP.      2. Nocturia: Patient is doing well with oxybutynin 5 mg nightly.      Follow-up 1 year, sooner if needed.  Problem List Items Addressed This Visit    None  Visit Diagnoses       BPH with urinary obstruction    -  Primary    Relevant Orders    POCT Bladder Scan    Nocturia        Relevant Orders    POCT Bladder Scan

## 2024-09-30 ENCOUNTER — TELEPHONE (OUTPATIENT)
Dept: HEMATOLOGY/ONCOLOGY | Facility: CLINIC | Age: 74
End: 2024-09-30
Payer: OTHER GOVERNMENT

## 2024-09-30 DIAGNOSIS — C34.11 MALIGNANT NEOPLASM OF UPPER LOBE OF RIGHT LUNG: Primary | ICD-10-CM

## 2024-09-30 NOTE — TELEPHONE ENCOUNTER
----- Message from Dara sent at 9/30/2024  8:17 AM CDT -----  Patient requesting call back in regards to orders for MRI/CT scan...please call him back at [phone]

## 2024-10-30 ENCOUNTER — OFFICE VISIT (OUTPATIENT)
Dept: HEMATOLOGY/ONCOLOGY | Facility: CLINIC | Age: 74
End: 2024-10-30
Payer: MEDICARE

## 2024-10-30 VITALS
RESPIRATION RATE: 16 BRPM | HEART RATE: 63 BPM | WEIGHT: 271.69 LBS | SYSTOLIC BLOOD PRESSURE: 137 MMHG | DIASTOLIC BLOOD PRESSURE: 74 MMHG | BODY MASS INDEX: 36.8 KG/M2 | OXYGEN SATURATION: 95 % | HEIGHT: 72 IN

## 2024-10-30 DIAGNOSIS — C34.91 NSCLC OF RIGHT LUNG: Primary | ICD-10-CM

## 2024-10-30 LAB
ALBUMIN SERPL BCP-MCNC: 3.6 G/DL (ref 3.4–5)
ALBUMIN/GLOBULIN RATIO: 1.03 RATIO (ref 1.1–1.8)
ALP SERPL-CCNC: 81 U/L (ref 46–116)
ALT SERPL W P-5'-P-CCNC: 19 U/L (ref 12–78)
ANION GAP SERPL CALC-SCNC: 7 MMOL/L (ref 3–11)
AST SERPL-CCNC: 19 U/L (ref 15–37)
BASOPHILS NFR BLD: 0.5 % (ref 0–3)
BILIRUB SERPL-MCNC: 0.4 MG/DL (ref 0–1)
BUN SERPL-MCNC: 15 MG/DL (ref 7–18)
BUN/CREAT SERPL: 13.04 RATIO (ref 7–18)
CALCIUM SERPL-MCNC: 9.3 MG/DL (ref 8.8–10.5)
CHLORIDE SERPL-SCNC: 102 MMOL/L (ref 100–108)
CO2 SERPL-SCNC: 29 MMOL/L (ref 21–32)
CREAT SERPL-MCNC: 1.15 MG/DL (ref 0.7–1.3)
EOSINOPHIL NFR BLD: 0.7 % (ref 1–3)
ERYTHROCYTE [DISTWIDTH] IN BLOOD BY AUTOMATED COUNT: 13.6 % (ref 12.5–18)
GFR ESTIMATION: 67
GLOBULIN: 3.5 G/DL (ref 2.3–3.5)
GLUCOSE SERPL-MCNC: 130 MG/DL (ref 70–110)
HCT VFR BLD AUTO: 41.7 % (ref 42–52)
HGB BLD-MCNC: 13.9 G/DL (ref 14–18)
LYMPHOCYTES NFR BLD: 25.4 % (ref 25–40)
MCH RBC QN AUTO: 26.4 PG (ref 27–31.2)
MCHC RBC AUTO-ENTMCNC: 33.3 G/DL (ref 31.8–35.4)
MCV RBC AUTO: 79.1 FL (ref 80–97)
MONOCYTES NFR BLD: 6.4 % (ref 1–15)
NEUTROPHILS # BLD AUTO: 4.9 10*3/UL (ref 1.8–7.7)
NEUTROPHILS NFR BLD: 66.6 % (ref 37–80)
NUCLEATED RED BLOOD CELLS: 0 %
PLATELETS: 198 10*3/UL (ref 142–424)
POTASSIUM SERPL-SCNC: 4.4 MMOL/L (ref 3.6–5.2)
PROT SERPL-MCNC: 7.1 G/DL (ref 6.4–8.2)
RBC # BLD AUTO: 5.27 10*6/UL (ref 4.7–6.1)
SODIUM BLD-SCNC: 138 MMOL/L (ref 135–145)
WBC # BLD: 7.4 10*3/UL (ref 4.6–10.2)

## 2024-10-30 PROCEDURE — 3075F SYST BP GE 130 - 139MM HG: CPT | Mod: CPTII,,, | Performed by: NURSE PRACTITIONER

## 2024-10-30 PROCEDURE — 1126F AMNT PAIN NOTED NONE PRSNT: CPT | Mod: CPTII,,, | Performed by: NURSE PRACTITIONER

## 2024-10-30 PROCEDURE — 3078F DIAST BP <80 MM HG: CPT | Mod: CPTII,,, | Performed by: NURSE PRACTITIONER

## 2024-10-30 PROCEDURE — 99214 OFFICE O/P EST MOD 30 MIN: CPT | Mod: S$PBB,,, | Performed by: NURSE PRACTITIONER

## 2024-10-30 PROCEDURE — 1159F MED LIST DOCD IN RCRD: CPT | Mod: CPTII,,, | Performed by: NURSE PRACTITIONER

## 2024-10-30 PROCEDURE — 1101F PT FALLS ASSESS-DOCD LE1/YR: CPT | Mod: CPTII,,, | Performed by: NURSE PRACTITIONER

## 2024-10-30 PROCEDURE — 3008F BODY MASS INDEX DOCD: CPT | Mod: CPTII,,, | Performed by: NURSE PRACTITIONER

## 2024-10-30 PROCEDURE — 3288F FALL RISK ASSESSMENT DOCD: CPT | Mod: CPTII,,, | Performed by: NURSE PRACTITIONER

## 2025-03-18 DIAGNOSIS — J96.11 CHRONIC HYPOXEMIC RESPIRATORY FAILURE: Primary | ICD-10-CM

## 2025-03-23 ENCOUNTER — PATIENT MESSAGE (OUTPATIENT)
Dept: PULMONOLOGY | Facility: CLINIC | Age: 75
End: 2025-03-23
Payer: OTHER GOVERNMENT

## 2025-03-26 ENCOUNTER — OFFICE VISIT (OUTPATIENT)
Dept: PULMONOLOGY | Facility: CLINIC | Age: 75
End: 2025-03-26
Payer: OTHER GOVERNMENT

## 2025-03-26 ENCOUNTER — CLINICAL SUPPORT (OUTPATIENT)
Dept: PULMONOLOGY | Facility: CLINIC | Age: 75
End: 2025-03-26
Payer: OTHER GOVERNMENT

## 2025-03-26 VITALS
RESPIRATION RATE: 17 BRPM | HEIGHT: 72 IN | BODY MASS INDEX: 34.67 KG/M2 | SYSTOLIC BLOOD PRESSURE: 110 MMHG | DIASTOLIC BLOOD PRESSURE: 77 MMHG | OXYGEN SATURATION: 96 % | HEART RATE: 69 BPM | WEIGHT: 256 LBS

## 2025-03-26 DIAGNOSIS — J96.11 CHRONIC HYPOXEMIC RESPIRATORY FAILURE: ICD-10-CM

## 2025-03-26 DIAGNOSIS — Z90.2 STATUS POST LOBECTOMY OF LUNG: ICD-10-CM

## 2025-03-26 DIAGNOSIS — J44.9 CHRONIC OBSTRUCTIVE PULMONARY DISEASE, UNSPECIFIED COPD TYPE: ICD-10-CM

## 2025-03-26 DIAGNOSIS — J98.4 RESTRICTIVE LUNG DISEASE: Primary | ICD-10-CM

## 2025-03-26 DIAGNOSIS — R06.09 EXERTIONAL DYSPNEA: ICD-10-CM

## 2025-03-26 PROCEDURE — 94010 BREATHING CAPACITY TEST: CPT | Mod: 26,S$PBB,, | Performed by: INTERNAL MEDICINE

## 2025-03-26 PROCEDURE — 94729 DIFFUSING CAPACITY: CPT | Mod: 26,S$PBB,, | Performed by: INTERNAL MEDICINE

## 2025-03-26 PROCEDURE — 94726 PLETHYSMOGRAPHY LUNG VOLUMES: CPT | Mod: 26,S$PBB,, | Performed by: INTERNAL MEDICINE

## 2025-03-26 PROCEDURE — 99213 OFFICE O/P EST LOW 20 MIN: CPT | Mod: S$PBB,25,, | Performed by: PHYSICIAN ASSISTANT

## 2025-03-26 NOTE — PROGRESS NOTES
Subjective:    Patient Identification:  Patient ID: Marcia Gregory is a 74 y.o. male.    Referring Provider:  No ref. provider found     Chief Complaint:  restrictive lung disease      History of Present Illness:    Marcia Gregory is a 74 y.o. male who presents to clinic for 1 year follow up.  Patient has restrictive lung disease with exertional dyspnea after right upper lobe lobectomy due to moderately differentiated squamous cell carcinoma and subsequent VATS for hemothorax in 2022.  Patient states he has been doing very well; his shortness of breath has overall improved since we first started seeing him.  He states he really only gets dyspneic with over exertion and vigorous physical activity.  He denies productive cough or wheezing.  He has been given an albuterol inhaler in the past to help with his shortness of breath but he stopped using this because it was giving him bad anxiety attacks and heart racing.  Patient also has lost some weight with the help of Ozempic through his primary care doctor which he feels has helped improve his shortness of breath as well.  Recent CT of his chest in October of 2024 shows no reoccurrence of his cancer.  He is due to see Dr. Baca  within the next month for his PET.  He denies any hospitalizations or respiratory illness since his last visit in February of 2024.  He smoked 1-1.5 ppd for about 40 years and quit 3 years ago.      Review of Systems:  Review of Systems   Constitutional:  Negative for fever, chills, appetite change, night sweats and weakness.   HENT:  Negative for postnasal drip, rhinorrhea, sore throat, trouble swallowing, voice change, congestion and ear pain.    Respiratory:  Positive for dyspnea on extertion. Negative for hemoptysis, sputum production, shortness of breath, wheezing, previous hospitialization due to pulmonary problems, use of rescue inhaler and somnolence.    Cardiovascular:  Negative for chest pain, palpitations and leg  swelling.   Genitourinary:  Negative for difficulty urinating and hematuria.   Endocrine:  Negative for polydipsia, polyphagia, cold intolerance, heat intolerance and polyuria.    Musculoskeletal:  Negative for joint swelling and myalgias.   Skin:  Negative for rash.   Gastrointestinal:  Negative for nausea, vomiting, abdominal pain and abdominal distention.   Neurological:  Negative for dizziness, syncope, light-headedness and headaches.   Psychiatric/Behavioral:  Negative for confusion and sleep disturbance. The patient is not nervous/anxious.        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    Skin cancer        Family History:      Family History   Problem Relation Name Age of Onset    Heart failure Father      Cancer Sister      Breast cancer Sister      Diabetes Brother          Social History:      Past Surgical History:   Procedure Laterality Date    cardiac stents      KNEE SURGERY      LUNG CANCER SURGERY      upper lobe removed    TONSILLECTOMY         Physical Exam:  /77 (BP Location: Right arm, Patient Position: Sitting)   Pulse 69   Resp 17   Ht 6' (1.829 m)   Wt 116.1 kg (256 lb)   SpO2 96%   BMI 34.72 kg/m²   Physical Exam   Constitutional: He is oriented to person, place, and time. He appears well-developed. He appears not cachectic. No distress. He is obese.   HENT:   Head: Normocephalic.   Right Ear: External ear normal.   Left Ear: External ear normal.   Nose: Nose normal. No mucosal edema. No polyps.   Mouth/Throat: Oropharynx is clear and moist. Normal dentition. No oropharyngeal exudate.   Neck: No JVD present. No tracheal deviation present. No thyromegaly present.   Cardiovascular: Normal rate,  "regular rhythm, normal heart sounds and intact distal pulses. Exam reveals no gallop and no friction rub.   No murmur heard.  Pulmonary/Chest: Normal expansion, symmetric chest wall expansion, effort normal and breath sounds normal. No stridor. No respiratory distress. He has no decreased breath sounds. He has no wheezes. He has no rhonchi. He has no rales. Chest wall is not dull to percussion. He exhibits no tenderness. Negative for egophony. Negative for tactile fremitus.   Abdominal: Soft. Bowel sounds are normal. He exhibits no distension and no mass. There is no hepatosplenomegaly. There is no abdominal tenderness. There is no rebound and no guarding. No hernia.   Musculoskeletal:         General: No tenderness, deformity or edema. Normal range of motion.      Cervical back: Normal range of motion and neck supple.   Lymphadenopathy: No supraclavicular adenopathy is present.     He has no cervical adenopathy.     He has no axillary adenopathy.   Neurological: He is alert and oriented to person, place, and time.   Skin: Skin is warm and dry. No rash noted. He is not diaphoretic. No cyanosis or erythema. No pallor. Nails show no clubbing.   Psychiatric: He has a normal mood and affect. His behavior is normal. Judgment and thought content normal.           No results found for: "PREFEV1", "CKA3MHTFFT", "PREFVC", "FVCPREREF", "IWGPIA9OPU", "ZYP6TPZZNOQ", "GVBD3GQP", "BSSF7BYO", "PREDLCO", "DLCOSBPRRF", "DLCOADJPRE", "DLCOCSBRPRRF", "POSTFEV1", "POSTFVC", "[id number]"   CT Chest With Contrast          [facility]                                  RADIOLOGY REPORT        PT NAME: FAIZA ALAS      [facility] IMAG[facility]     : 1950 M 73             [address]    ACCT: [id number]                                              [facility] Rec #: [id number]                                        [phone]    [facility]             Procedure: CHEST THORAX  W CONT    REQUISITION #: [id number]      REPORT #: [id number]           DATE OF EXAM: 10/23/24    TIME OF EXAM: 0900       EXAM: CHEST THORAX  W CONT, 10/23/2024 6:32 AM PDT        HISTORY: METASTATIC DISEASE EVAL: METASTATIC DISEASE EVAL        TECHNIQUE: Axial CT images of the chest were obtained following intravenous    administration of 100 mL of Isovue-300. In accordance with CT protocols and    the ALARA principal, radiation dose reduction techniques were utilized for   this examination. Total radiation dose was 17.88 mSv.        COMPARISON: April 30, 2024        FINDINGS:        Heart and vasculature: Coronary artery disease.        Esophagus: Within anticipated limits.        Lungs and airways: Status post right upper lobectomy minimal lung scarring   is present, unchanged        Bones: Within anticipated limits.        Other: Negative for acute abnormality.            IMPRESSION:    1. Status post right upper lobectomy.    2. No evidence of recurrence of malignancy. No acute abnormality.        Signer Name: Mark Broadbent, MD    Signed: 10/23/2024 8:51 AM PDT    ()        DICTATING PHYSICIAN:  BROADBENT,MARK MD                   Date Dictated: 10/23/24 0832        Signed By:  BROADBENT, MARK MD     Date Signed:  10/23/24 1055     CC: MECHE GARCIA MD ; MECHE GARCIA MD      ADMITTING PHYSICIAN:                                                                                                    ORDERING PHY: MECHE GARCIA MD                                                                                                                                                      ATTENDING PHY: MECHE GARCIA MD    Patient Status:  REG CLI    Admit Service Date: 10/23/24                Accessory Clinical Data:  Chest x-ray:    CT scan:     PFTs: 3/26/25 PFTs are consistent with mild mixed obstructive and restrictive defect. Diffusion capacity is mild-to  moderately reduced.    6MWT:     TTE:    Lab data:    All radiographic imaging of the chest, PFT tracings/data, and 6MWT data have been independently reviewed and interpreted unless otherwise specified.     Assessment and Plan:      Problem List Items Addressed This Visit       Chronic obstructive pulmonary disease, unspecified COPD type    Restrictive lung disease - Primary    Current Assessment & Plan   PFTs show mixed restrictive and obstructive defect with mild to moderately reduced DLCO.          Other Visit Diagnoses         Exertional dyspnea          Status post lobectomy of lung               Patient has had improvement in exertional shortness of breath. He continues to decline albuterol inhaler at this time. He continues to see Dr. Baca for routine imaging surveillance of NSCLC. He is asymptomatic at this time and is doing well.  Recommend to get age-appropriate preventative vaccinations with primary care doctor  He should return in 1 year with PFTs or sooner if needed.   He is doing well on his weight loss journey and was encourage to continue. He is receiving Ozempic through his primary care doctor.       Follow up in about 1 year (around 3/26/2026).

## 2025-04-30 ENCOUNTER — OFFICE VISIT (OUTPATIENT)
Dept: HEMATOLOGY/ONCOLOGY | Facility: CLINIC | Age: 75
End: 2025-04-30
Payer: OTHER GOVERNMENT

## 2025-04-30 VITALS
RESPIRATION RATE: 16 BRPM | DIASTOLIC BLOOD PRESSURE: 73 MMHG | WEIGHT: 253.88 LBS | HEART RATE: 73 BPM | OXYGEN SATURATION: 96 % | SYSTOLIC BLOOD PRESSURE: 121 MMHG | BODY MASS INDEX: 34.39 KG/M2 | HEIGHT: 72 IN

## 2025-04-30 DIAGNOSIS — C34.91 NSCLC OF RIGHT LUNG: Primary | ICD-10-CM

## 2025-04-30 DIAGNOSIS — E66.01 SEVERE OBESITY (BMI 35.0-39.9) WITH COMORBIDITY: ICD-10-CM

## 2025-04-30 PROCEDURE — 99215 OFFICE O/P EST HI 40 MIN: CPT | Mod: S$PBB,,, | Performed by: NURSE PRACTITIONER

## 2025-04-30 NOTE — PROGRESS NOTES
MEDICAL ONCOLOGY FOLLOW UP CONSULTATION NOTE    Patient ID: Marcia Gregory is a 74 y.o. male.    Chief Complaint: Lung Cancer    HPI:  Patient is a 71-year-old male active current smoker with past medical history of DVT, diabetes mellitus, hyperlipidemia, hypertension, CAD status post myocardial infarction with past surgical history of knee replacement and tonsillectomy was referred by Pulmonary after recent right upper lobe lung nodule core biopsy was consistent with moderately differentiated squamous cell carcinoma please see detailed pathology report below.  Patient reports having a PET scan done on 2022 which showed findings as below.  Patient presents to our clinic today 22 for further evaluation.      Imagin22    Chest:  There is a 12 mm nodule in the right upper lung field showing an increased radiotracer uptake with an SUV of 12.6    Abdomen:  Normal  Pelvis:  Normal  Osseous structures:  Normal    Impression:  Hypermetabolic nodule in the right upper lung field consistent with malignancy    Pathology:     22: pT1c pN0 M0 G2        22  RIGHT UPPER LOBE OF LUNG, NODULE, CORE BIOPSY:      - MODERATELY DIFFERENTIATED SQUAMOUS CELL CARCINOMA (NON-SMALL   CARCINOMA).   Comment:  Immunohistochemical staining is performed upon sections from block   1A.  The tumor cells demonstrate positivity for CK5/6, p40, and p63.   Staining is negative for TTF-1 and CK7.  All associated controls stain as   expected.         Past Medical History:   Diagnosis Date    Blood clot in vein     Diabetes mellitus, type 2     Hyperlipidemia     Hypertension     Myocardial infarction     NSCLC of right lung 2022    Skin cancer      Family History   Problem Relation Name Age of Onset    Heart failure Father      Cancer Sister      Breast cancer Sister      Diabetes Brother       Social History     Socioeconomic History    Marital status:    Tobacco Use    Smoking status:  Former     Current packs/day: 0.00     Average packs/day: 1 pack/day for 51.0 years (51.0 ttl pk-yrs)     Types: Cigarettes     Start date: 1971     Quit date: 2022     Years since quittin.7     Passive exposure: Current    Smokeless tobacco: Never    Tobacco comments:     Patient states he is cutting down 6-7 cigarettes a day.     Patient has stopped smoking. LB   Substance and Sexual Activity    Alcohol use: Not Currently     Comment: maybe once a year    Drug use: Never     Past Surgical History:   Procedure Laterality Date    cardiac stents      KNEE SURGERY      LUNG CANCER SURGERY      upper lobe removed    TONSILLECTOMY           Review of systems:  Review of Systems   Constitutional:  Negative for activity change, appetite change, chills, diaphoresis, fatigue and unexpected weight change.   HENT:  Negative for congestion, facial swelling, hearing loss, mouth sores, trouble swallowing and voice change.    Eyes:  Negative for photophobia, pain, discharge and itching.   Respiratory:  Negative for apnea, cough, choking, chest tightness and shortness of breath.    Cardiovascular:  Negative for chest pain, palpitations and leg swelling.   Gastrointestinal:  Negative for abdominal distention, abdominal pain, anal bleeding and blood in stool.   Endocrine: Negative for cold intolerance, heat intolerance, polydipsia and polyphagia.   Genitourinary:  Negative for difficulty urinating, dysuria, flank pain and hematuria.   Musculoskeletal:  Negative for arthralgias, back pain, joint swelling, myalgias, neck pain and neck stiffness.   Skin:  Negative for color change, pallor and wound.   Allergic/Immunologic: Negative for environmental allergies, food allergies and immunocompromised state.   Neurological:  Negative for dizziness, seizures, facial asymmetry, speech difficulty, light-headedness, numbness and headaches.   Hematological:  Negative for adenopathy. Does not bruise/bleed easily.    Psychiatric/Behavioral:  Negative for agitation, behavioral problems, confusion, decreased concentration and sleep disturbance.             Physical Exam  Vitals and nursing note reviewed.   Constitutional:       General: He is not in acute distress.     Appearance: Normal appearance. He is not ill-appearing.   HENT:      Head: Normocephalic and atraumatic.      Nose: No congestion or rhinorrhea.   Eyes:      General: No scleral icterus.     Extraocular Movements: Extraocular movements intact.      Pupils: Pupils are equal, round, and reactive to light.   Cardiovascular:      Rate and Rhythm: Normal rate and regular rhythm.      Pulses: Normal pulses.      Heart sounds: Normal heart sounds. No murmur heard.     No gallop.   Pulmonary:      Effort: Pulmonary effort is normal. No respiratory distress.      Breath sounds: Normal breath sounds. No stridor. No wheezing or rhonchi.   Abdominal:      General: Bowel sounds are normal. There is no distension.      Palpations: There is no mass.      Tenderness: There is no abdominal tenderness. There is no guarding.   Musculoskeletal:         General: No swelling, tenderness, deformity or signs of injury. Normal range of motion.      Cervical back: Normal range of motion and neck supple. No rigidity. No muscular tenderness.      Right lower leg: No edema.      Left lower leg: No edema.   Skin:     General: Skin is warm.      Coloration: Skin is not jaundiced or pale.      Findings: No bruising or lesion.   Neurological:      General: No focal deficit present.      Mental Status: He is alert and oriented to person, place, and time.      Cranial Nerves: No cranial nerve deficit.      Sensory: No sensory deficit.      Motor: No weakness.      Gait: Gait normal.   Psychiatric:         Mood and Affect: Mood normal.         Behavior: Behavior normal.         Thought Content: Thought content normal.              Vitals:    04/30/25 1314   BP: 121/73   Pulse: 73   Resp: 16        Body surface area is 2.42 meters squared.    Assessment/Plan:      ECOG 1    1. Moderately differentiated squamous cell carcinoma arising from the right upper lobe of lung:    == cT1b, cN0, M0, Stage IA2.  PET scan done 05/16/2022 more than 2 months back showed clinical findings as above.  == Core biopsy done 07/20/2022 completed few days back showed findings as above.  I discussed with him further management options for early stage lung cancer.  PFTs showed mildly reduced at DLCO of 70% predicted likely due to active smoking otherwise- normal profile.  I will make referral to cardiothoracic surgery for further evaluation.  Ideally I would like a PET scan to be repeated since it has been more than 2 months since his prior PET scan, but even if this is not done I would like this patient to be evaluated by Cardiothoracic surgery for consideration of pathologic mediastinal lymph node evaluation and if an operable candidate to undergo surgical exploration and resection.  Based on surgical resection we will determine if there is a need for any adjuvant treatment.  For clinical stage I A 2 with negative margins only observation is indicated.   ==8/31/22: Review of PET/CT shows very minimal disease growth. Lung lesion now measuring 14 mm (12 mm prior).  He has seen cardiothoracic surgery at this time and will be scheduled for surgical resection in the coming weeks.  Will see him post surgery   ==11/14/22: s/p right upper lobectomy with mediastinal lymphadenopathy does section with negative nodes on 09/21/22 by Dr. Isaac for right upper lobe nodule which was biopsy-proven moderately differentiated squamous cell cancer. (pT1c pN0 M0).  This was complicated by hemothorax requiring VATS and evacuation of hematoma with atelectasis requiring bronchoscopy and brief mechanical ventilation.Patient has recovered well since then.  He still has supplemental oxygen with him that he uses as needed during ambulation.   ==10/30/24:   No evidence of recurrence on recent CT scan chest 10/2024 Continue observation. Repeat Imaging per NCCN surveillance guidelines for Stage IA squamous cell cancer with CT scan chest with IV contrast q 6 monthly for 3 years. Next CT scan due in April 2025  == 4/30/25: doing well with no c/o. Recent ct chest on 4/2025 [facility], has completed 3 years of post surgical CT scans. Will continue to see patient every 6 months and repeat ct scan in 1 year.     2. Right intranasal lesion: WELL DIFFERENTIATED VERRUCOID SCC CAN'T CONFIRM INVASION:  == Evaluated at [facility] for nasal polyp at the end of July, s/p recision on 8/2/22.  == Continue to follow[facility]up at [facility] prn     RTC in 6 months for np with cbc cmp     Total time spent in counseling and discussion about further management options including relevant lab work, treatment,  prognosis, medications and intended side effects was more than 30 minutes. More than 50% of the time was spent on counseling and coordination of care.  I spent a total of 30 minutes on the day of the visit.This includes face to face time and non-face to face time preparing to see the patient (eg, review of tests), Obtaining and/or reviewing separately obtained history, Documenting clinical information in the electronic or other health record, Independently interpreting resultsand communicating results to the patient/family/caregiver, or Care coordination.

## 2025-05-03 ENCOUNTER — PATIENT MESSAGE (OUTPATIENT)
Dept: HEMATOLOGY/ONCOLOGY | Facility: CLINIC | Age: 75
End: 2025-05-03
Payer: OTHER GOVERNMENT

## 2025-05-15 ENCOUNTER — TELEPHONE (OUTPATIENT)
Dept: HEMATOLOGY/ONCOLOGY | Facility: CLINIC | Age: 75
End: 2025-05-15
Payer: OTHER GOVERNMENT